# Patient Record
Sex: MALE | Race: WHITE | Employment: OTHER | URBAN - METROPOLITAN AREA
[De-identification: names, ages, dates, MRNs, and addresses within clinical notes are randomized per-mention and may not be internally consistent; named-entity substitution may affect disease eponyms.]

---

## 2017-01-23 ENCOUNTER — APPOINTMENT (OUTPATIENT)
Dept: MRI IMAGING | Age: 82
End: 2017-01-23
Attending: INTERNAL MEDICINE
Payer: MEDICARE

## 2017-01-23 ENCOUNTER — APPOINTMENT (OUTPATIENT)
Dept: CT IMAGING | Age: 82
End: 2017-01-23
Attending: EMERGENCY MEDICINE
Payer: MEDICARE

## 2017-01-23 ENCOUNTER — APPOINTMENT (OUTPATIENT)
Dept: ULTRASOUND IMAGING | Age: 82
End: 2017-01-23
Attending: INTERNAL MEDICINE
Payer: MEDICARE

## 2017-01-23 ENCOUNTER — HOSPITAL ENCOUNTER (OUTPATIENT)
Age: 82
Setting detail: OBSERVATION
Discharge: HOME HEALTH | End: 2017-01-26
Attending: EMERGENCY MEDICINE | Admitting: INTERNAL MEDICINE
Payer: MEDICARE

## 2017-01-23 ENCOUNTER — APPOINTMENT (OUTPATIENT)
Dept: GENERAL RADIOLOGY | Age: 82
End: 2017-01-23
Attending: EMERGENCY MEDICINE
Payer: MEDICARE

## 2017-01-23 DIAGNOSIS — G45.8 OTHER SPECIFIED TRANSIENT CEREBRAL ISCHEMIAS: Primary | ICD-10-CM

## 2017-01-23 DIAGNOSIS — M62.82 NON-TRAUMATIC RHABDOMYOLYSIS: ICD-10-CM

## 2017-01-23 PROBLEM — G45.9 TIA (TRANSIENT ISCHEMIC ATTACK): Status: ACTIVE | Noted: 2017-01-23

## 2017-01-23 LAB
ALBUMIN SERPL BCP-MCNC: 3.3 G/DL (ref 3.2–4.6)
ALBUMIN/GLOB SERPL: 0.9 {RATIO} (ref 1.2–3.5)
ALP SERPL-CCNC: 47 U/L (ref 50–136)
ALT SERPL-CCNC: 14 U/L (ref 12–65)
ANION GAP BLD CALC-SCNC: 8 MMOL/L (ref 7–16)
APPEARANCE UR: CLEAR
AST SERPL W P-5'-P-CCNC: 44 U/L (ref 15–37)
BACTERIA URNS QL MICRO: 0 /HPF
BASOPHILS # BLD AUTO: 0 K/UL (ref 0–0.2)
BASOPHILS # BLD: 0 % (ref 0–2)
BILIRUB SERPL-MCNC: 0.5 MG/DL (ref 0.2–1.1)
BILIRUB UR QL: NEGATIVE
BUN SERPL-MCNC: 32 MG/DL (ref 8–23)
CALCIUM SERPL-MCNC: 8.9 MG/DL (ref 8.3–10.4)
CASTS URNS QL MICRO: ABNORMAL /LPF
CHLORIDE SERPL-SCNC: 107 MMOL/L (ref 98–107)
CK SERPL-CCNC: 1253 U/L (ref 21–215)
CO2 SERPL-SCNC: 26 MMOL/L (ref 21–32)
COLOR UR: YELLOW
CREAT SERPL-MCNC: 2.75 MG/DL (ref 0.8–1.5)
DIFFERENTIAL METHOD BLD: ABNORMAL
EOSINOPHIL # BLD: 0.1 K/UL (ref 0–0.8)
EOSINOPHIL NFR BLD: 1 % (ref 0.5–7.8)
EPI CELLS #/AREA URNS HPF: ABNORMAL /HPF
ERYTHROCYTE [DISTWIDTH] IN BLOOD BY AUTOMATED COUNT: 14.6 % (ref 11.9–14.6)
EST. AVERAGE GLUCOSE BLD GHB EST-MCNC: 160 MG/DL
GLOBULIN SER CALC-MCNC: 3.8 G/DL (ref 2.3–3.5)
GLUCOSE BLD STRIP.AUTO-MCNC: 177 MG/DL (ref 65–100)
GLUCOSE SERPL-MCNC: 175 MG/DL (ref 65–100)
GLUCOSE UR STRIP.AUTO-MCNC: 100 MG/DL
HBA1C MFR BLD: 7.2 % (ref 4.8–6)
HCT VFR BLD AUTO: 36 % (ref 41.1–50.3)
HGB BLD-MCNC: 11.5 G/DL (ref 13.6–17.2)
HGB UR QL STRIP: ABNORMAL
IMM GRANULOCYTES # BLD: 0 K/UL (ref 0–0.5)
IMM GRANULOCYTES NFR BLD AUTO: 0.4 % (ref 0–5)
KETONES UR QL STRIP.AUTO: NEGATIVE MG/DL
LACTATE BLD-SCNC: 2 MMOL/L (ref 0.5–1.9)
LEUKOCYTE ESTERASE UR QL STRIP.AUTO: NEGATIVE
LYMPHOCYTES # BLD AUTO: 15 % (ref 13–44)
LYMPHOCYTES # BLD: 1.2 K/UL (ref 0.5–4.6)
MCH RBC QN AUTO: 29.9 PG (ref 26.1–32.9)
MCHC RBC AUTO-ENTMCNC: 31.9 G/DL (ref 31.4–35)
MCV RBC AUTO: 93.8 FL (ref 79.6–97.8)
MONOCYTES # BLD: 0.6 K/UL (ref 0.1–1.3)
MONOCYTES NFR BLD AUTO: 7 % (ref 4–12)
NEUTS SEG # BLD: 6 K/UL (ref 1.7–8.2)
NEUTS SEG NFR BLD AUTO: 77 % (ref 43–78)
NITRITE UR QL STRIP.AUTO: NEGATIVE
PH UR STRIP: 5 [PH] (ref 5–9)
PLATELET # BLD AUTO: 323 K/UL (ref 150–450)
PMV BLD AUTO: 10.8 FL (ref 10.8–14.1)
POTASSIUM SERPL-SCNC: 4.4 MMOL/L (ref 3.5–5.1)
PROT SERPL-MCNC: 7.1 G/DL (ref 6.3–8.2)
PROT UR STRIP-MCNC: ABNORMAL MG/DL
RBC # BLD AUTO: 3.84 M/UL (ref 4.23–5.67)
RBC #/AREA URNS HPF: ABNORMAL /HPF
SODIUM SERPL-SCNC: 141 MMOL/L (ref 136–145)
SP GR UR REFRACTOMETRY: 1.02 (ref 1–1.02)
TSH SERPL DL<=0.005 MIU/L-ACNC: 1.33 UIU/ML (ref 0.36–3.74)
UROBILINOGEN UR QL STRIP.AUTO: 0.2 EU/DL (ref 0.2–1)
WBC # BLD AUTO: 7.9 K/UL (ref 4.3–11.1)
WBC URNS QL MICRO: ABNORMAL /HPF

## 2017-01-23 PROCEDURE — 96372 THER/PROPH/DIAG INJ SC/IM: CPT

## 2017-01-23 PROCEDURE — 96360 HYDRATION IV INFUSION INIT: CPT | Performed by: EMERGENCY MEDICINE

## 2017-01-23 PROCEDURE — 71020 XR CHEST PA LAT: CPT

## 2017-01-23 PROCEDURE — 99218 HC RM OBSERVATION: CPT

## 2017-01-23 PROCEDURE — 96361 HYDRATE IV INFUSION ADD-ON: CPT

## 2017-01-23 PROCEDURE — 83605 ASSAY OF LACTIC ACID: CPT

## 2017-01-23 PROCEDURE — 82550 ASSAY OF CK (CPK): CPT | Performed by: EMERGENCY MEDICINE

## 2017-01-23 PROCEDURE — 99285 EMERGENCY DEPT VISIT HI MDM: CPT | Performed by: EMERGENCY MEDICINE

## 2017-01-23 PROCEDURE — 74011250636 HC RX REV CODE- 250/636: Performed by: INTERNAL MEDICINE

## 2017-01-23 PROCEDURE — 84443 ASSAY THYROID STIM HORMONE: CPT | Performed by: INTERNAL MEDICINE

## 2017-01-23 PROCEDURE — 74011250636 HC RX REV CODE- 250/636: Performed by: EMERGENCY MEDICINE

## 2017-01-23 PROCEDURE — 70551 MRI BRAIN STEM W/O DYE: CPT

## 2017-01-23 PROCEDURE — 70450 CT HEAD/BRAIN W/O DYE: CPT

## 2017-01-23 PROCEDURE — G0378 HOSPITAL OBSERVATION PER HR: HCPCS

## 2017-01-23 PROCEDURE — 93880 EXTRACRANIAL BILAT STUDY: CPT

## 2017-01-23 PROCEDURE — 74011000302 HC RX REV CODE- 302: Performed by: INTERNAL MEDICINE

## 2017-01-23 PROCEDURE — 81001 URINALYSIS AUTO W/SCOPE: CPT | Performed by: INTERNAL MEDICINE

## 2017-01-23 PROCEDURE — 85025 COMPLETE CBC W/AUTO DIFF WBC: CPT | Performed by: EMERGENCY MEDICINE

## 2017-01-23 PROCEDURE — 74011636637 HC RX REV CODE- 636/637: Performed by: INTERNAL MEDICINE

## 2017-01-23 PROCEDURE — 83036 HEMOGLOBIN GLYCOSYLATED A1C: CPT | Performed by: INTERNAL MEDICINE

## 2017-01-23 PROCEDURE — 82962 GLUCOSE BLOOD TEST: CPT

## 2017-01-23 PROCEDURE — A9270 NON-COVERED ITEM OR SERVICE: HCPCS | Performed by: INTERNAL MEDICINE

## 2017-01-23 PROCEDURE — 96361 HYDRATE IV INFUSION ADD-ON: CPT | Performed by: EMERGENCY MEDICINE

## 2017-01-23 PROCEDURE — 86580 TB INTRADERMAL TEST: CPT | Performed by: INTERNAL MEDICINE

## 2017-01-23 PROCEDURE — 80053 COMPREHEN METABOLIC PANEL: CPT | Performed by: EMERGENCY MEDICINE

## 2017-01-23 PROCEDURE — 74011250637 HC RX REV CODE- 250/637: Performed by: INTERNAL MEDICINE

## 2017-01-23 RX ORDER — DOCUSATE SODIUM 100 MG/1
100 CAPSULE, LIQUID FILLED ORAL 2 TIMES DAILY
Status: DISCONTINUED | OUTPATIENT
Start: 2017-01-23 | End: 2017-01-26 | Stop reason: HOSPADM

## 2017-01-23 RX ORDER — SODIUM CHLORIDE 9 MG/ML
500 INJECTION, SOLUTION INTRAVENOUS ONCE
Status: COMPLETED | OUTPATIENT
Start: 2017-01-23 | End: 2017-01-23

## 2017-01-23 RX ORDER — GUAIFENESIN 100 MG/5ML
81 LIQUID (ML) ORAL DAILY
Status: DISCONTINUED | OUTPATIENT
Start: 2017-01-24 | End: 2017-01-26 | Stop reason: HOSPADM

## 2017-01-23 RX ORDER — TRAZODONE HYDROCHLORIDE 50 MG/1
100 TABLET ORAL
Status: DISCONTINUED | OUTPATIENT
Start: 2017-01-23 | End: 2017-01-26 | Stop reason: HOSPADM

## 2017-01-23 RX ORDER — SODIUM CHLORIDE 0.9 % (FLUSH) 0.9 %
5-10 SYRINGE (ML) INJECTION EVERY 8 HOURS
Status: DISCONTINUED | OUTPATIENT
Start: 2017-01-23 | End: 2017-01-26 | Stop reason: HOSPADM

## 2017-01-23 RX ORDER — GABAPENTIN 300 MG/1
300 CAPSULE ORAL DAILY
Status: DISCONTINUED | OUTPATIENT
Start: 2017-01-24 | End: 2017-01-26 | Stop reason: HOSPADM

## 2017-01-23 RX ORDER — MIDODRINE HYDROCHLORIDE 5 MG/1
5 TABLET ORAL
Status: DISCONTINUED | OUTPATIENT
Start: 2017-01-24 | End: 2017-01-26 | Stop reason: HOSPADM

## 2017-01-23 RX ORDER — FINASTERIDE 5 MG/1
5 TABLET, FILM COATED ORAL DAILY
Status: DISCONTINUED | OUTPATIENT
Start: 2017-01-24 | End: 2017-01-26 | Stop reason: HOSPADM

## 2017-01-23 RX ORDER — CIPROFLOXACIN 500 MG/1
500 TABLET ORAL EVERY 24 HOURS
Status: DISCONTINUED | OUTPATIENT
Start: 2017-01-23 | End: 2017-01-26 | Stop reason: HOSPADM

## 2017-01-23 RX ORDER — INSULIN LISPRO 100 [IU]/ML
INJECTION, SOLUTION INTRAVENOUS; SUBCUTANEOUS
Status: DISCONTINUED | OUTPATIENT
Start: 2017-01-23 | End: 2017-01-26 | Stop reason: HOSPADM

## 2017-01-23 RX ORDER — SOLIFENACIN SUCCINATE 10 MG/1
10 TABLET, FILM COATED ORAL DAILY
Status: DISCONTINUED | OUTPATIENT
Start: 2017-01-24 | End: 2017-01-26 | Stop reason: HOSPADM

## 2017-01-23 RX ORDER — SODIUM CHLORIDE 0.9 % (FLUSH) 0.9 %
5-10 SYRINGE (ML) INJECTION AS NEEDED
Status: DISCONTINUED | OUTPATIENT
Start: 2017-01-23 | End: 2017-01-26 | Stop reason: HOSPADM

## 2017-01-23 RX ORDER — INSULIN GLARGINE 100 [IU]/ML
10 INJECTION, SOLUTION SUBCUTANEOUS
Status: DISCONTINUED | OUTPATIENT
Start: 2017-01-23 | End: 2017-01-26 | Stop reason: HOSPADM

## 2017-01-23 RX ORDER — HEPARIN SODIUM 5000 [USP'U]/ML
5000 INJECTION, SOLUTION INTRAVENOUS; SUBCUTANEOUS EVERY 8 HOURS
Status: DISCONTINUED | OUTPATIENT
Start: 2017-01-23 | End: 2017-01-26 | Stop reason: HOSPADM

## 2017-01-23 RX ORDER — HYDROCODONE BITARTRATE AND ACETAMINOPHEN 5; 325 MG/1; MG/1
1 TABLET ORAL
Status: DISCONTINUED | OUTPATIENT
Start: 2017-01-23 | End: 2017-01-26 | Stop reason: HOSPADM

## 2017-01-23 RX ORDER — ACETAMINOPHEN 325 MG/1
650 TABLET ORAL
Status: DISCONTINUED | OUTPATIENT
Start: 2017-01-23 | End: 2017-01-26 | Stop reason: HOSPADM

## 2017-01-23 RX ORDER — TAMSULOSIN HYDROCHLORIDE 0.4 MG/1
0.4 CAPSULE ORAL DAILY
Status: DISCONTINUED | OUTPATIENT
Start: 2017-01-24 | End: 2017-01-26 | Stop reason: HOSPADM

## 2017-01-23 RX ORDER — ESCITALOPRAM OXALATE 10 MG/1
20 TABLET ORAL DAILY
Status: DISCONTINUED | OUTPATIENT
Start: 2017-01-24 | End: 2017-01-26 | Stop reason: HOSPADM

## 2017-01-23 RX ORDER — CIPROFLOXACIN 500 MG/1
500 TABLET ORAL EVERY 12 HOURS
Status: DISCONTINUED | OUTPATIENT
Start: 2017-01-23 | End: 2017-01-23 | Stop reason: DRUGHIGH

## 2017-01-23 RX ORDER — ZOLPIDEM TARTRATE 5 MG/1
10 TABLET ORAL
Status: DISCONTINUED | OUTPATIENT
Start: 2017-01-23 | End: 2017-01-26 | Stop reason: HOSPADM

## 2017-01-23 RX ORDER — LANOLIN ALCOHOL/MO/W.PET/CERES
1 CREAM (GRAM) TOPICAL
Status: DISCONTINUED | OUTPATIENT
Start: 2017-01-24 | End: 2017-01-26 | Stop reason: HOSPADM

## 2017-01-23 RX ORDER — BISACODYL 5 MG
5 TABLET, DELAYED RELEASE (ENTERIC COATED) ORAL DAILY PRN
Status: DISCONTINUED | OUTPATIENT
Start: 2017-01-23 | End: 2017-01-26 | Stop reason: HOSPADM

## 2017-01-23 RX ORDER — SODIUM CHLORIDE 9 MG/ML
1000 INJECTION, SOLUTION INTRAVENOUS ONCE
Status: COMPLETED | OUTPATIENT
Start: 2017-01-23 | End: 2017-01-23

## 2017-01-23 RX ORDER — LANOLIN ALCOHOL/MO/W.PET/CERES
1000 CREAM (GRAM) TOPICAL DAILY
Status: DISCONTINUED | OUTPATIENT
Start: 2017-01-24 | End: 2017-01-26 | Stop reason: HOSPADM

## 2017-01-23 RX ORDER — SODIUM CHLORIDE 9 MG/ML
100 INJECTION, SOLUTION INTRAVENOUS CONTINUOUS
Status: DISCONTINUED | OUTPATIENT
Start: 2017-01-23 | End: 2017-01-26 | Stop reason: HOSPADM

## 2017-01-23 RX ORDER — TRAMADOL HYDROCHLORIDE 50 MG/1
50 TABLET ORAL
Status: DISCONTINUED | OUTPATIENT
Start: 2017-01-23 | End: 2017-01-26 | Stop reason: HOSPADM

## 2017-01-23 RX ADMIN — INSULIN LISPRO 2 UNITS: 100 INJECTION, SOLUTION INTRAVENOUS; SUBCUTANEOUS at 23:13

## 2017-01-23 RX ADMIN — Medication 5 ML: at 23:15

## 2017-01-23 RX ADMIN — Medication 5 ML: at 23:14

## 2017-01-23 RX ADMIN — SODIUM CHLORIDE 500 ML: 900 INJECTION, SOLUTION INTRAVENOUS at 14:50

## 2017-01-23 RX ADMIN — HEPARIN SODIUM 5000 UNITS: 5000 INJECTION, SOLUTION INTRAVENOUS; SUBCUTANEOUS at 23:10

## 2017-01-23 RX ADMIN — SODIUM CHLORIDE 500 ML: 900 INJECTION, SOLUTION INTRAVENOUS at 13:41

## 2017-01-23 RX ADMIN — CIPROFLOXACIN HYDROCHLORIDE 500 MG: 500 TABLET, FILM COATED ORAL at 23:14

## 2017-01-23 RX ADMIN — HYDROCODONE BITARTRATE AND ACETAMINOPHEN 1 TABLET: 5; 325 TABLET ORAL at 23:14

## 2017-01-23 RX ADMIN — TUBERCULIN PURIFIED PROTEIN DERIVATIVE 5 UNITS: 5 INJECTION INTRADERMAL at 23:20

## 2017-01-23 RX ADMIN — SODIUM CHLORIDE 1000 ML: 900 INJECTION, SOLUTION INTRAVENOUS at 16:55

## 2017-01-23 RX ADMIN — TRAZODONE HYDROCHLORIDE 100 MG: 50 TABLET ORAL at 23:15

## 2017-01-23 RX ADMIN — DOCUSATE SODIUM 100 MG: 100 CAPSULE, LIQUID FILLED ORAL at 23:14

## 2017-01-23 RX ADMIN — SODIUM CHLORIDE 100 ML/HR: 900 INJECTION, SOLUTION INTRAVENOUS at 23:11

## 2017-01-23 RX ADMIN — INSULIN GLARGINE 10 UNITS: 100 INJECTION, SOLUTION SUBCUTANEOUS at 23:12

## 2017-01-23 NOTE — ED NOTES
TRANSFER - OUT REPORT:    Verbal report given to [facility] RN(name) on [employer]  being transferred to 8th(unit) for routine progression of care       Report consisted of patients Situation, Background, Assessment and   Recommendations(SBAR). Information from the following report(s) SBAR and ED Summary was reviewed with the receiving nurse. Lines:   Peripheral IV 05/30/16 Left Antecubital (Active)        Opportunity for questions and clarification was provided.       Patient transported with:   [employer]

## 2017-01-23 NOTE — IP AVS SNAPSHOT
Current Discharge Medication List  
  
Take these medications at their scheduled times Dose & Instructions Dispensing Information Comments Morning Noon Evening Bedtime  
 aspirin delayed-release 81 mg tablet Commonly known as:  ECOTRIN LOW STRENGTH Your next dose is: Today, Tomorrow Other:  ____________ Dose:  81 mg Take 1 Tab by mouth daily. Quantity:  30 Tab Refills:  0  
     
   
   
   
  
 atorvastatin 20 mg tablet Commonly known as:  LIPITOR Your next dose is: Today, Tomorrow Other:  ____________ Dose:  20 mg Take 1 Tab by mouth daily. Quantity:  90 Tab Refills:  1  
     
   
   
   
  
 bethanechol 25 mg tablet Commonly known as:  URECHOLINE Your next dose is: Today, Tomorrow Other:  ____________ Dose:  25 mg Take 1 Tab by mouth Before breakfast, lunch, and dinner. Quantity:  270 Tab Refills:  1  
     
   
   
   
  
 cyanocobalamin 1,000 mcg tablet Your next dose is: Today, Tomorrow Other:  ____________ Dose:  1000 mcg Take 1,000 mcg by mouth daily. Refills:  0  
     
   
   
   
  
 docusate sodium 100 mg capsule Commonly known as:  Melvenia Clipper Your next dose is: Today, Tomorrow Other:  ____________ Dose:  100 mg Take 100 mg by mouth two (2) times a day. Refills:  0  
     
   
   
   
  
 escitalopram oxalate 20 mg tablet Commonly known as:  Solvang Denier Your next dose is: Today, Tomorrow Other:  ____________ Dose:  20 mg Take 1 Tab by mouth daily. Quantity:  90 Tab Refills:  1  
     
   
   
   
  
 fenofibrate nanocrystallized 145 mg tablet Commonly known as:  [employer] Your next dose is: Today, Tomorrow Other:  ____________ Dose:  145 mg Take 1 Tab by mouth daily. Quantity:  90 Tab Refills:  1  
     
   
   
   
  
 ferrous sulfate 325 mg (65 mg iron) tablet Your next dose is: Today, Tomorrow Other:  ____________ Take  by mouth Daily (before breakfast). Refills:  0  
     
   
   
   
  
 finasteride 5 mg tablet Commonly known as:  PROSCAR Your next dose is: Today, Tomorrow Other:  ____________ Dose:  5 mg Take 1 Tab by mouth daily. Quantity:  90 Tab Refills:  1  
     
   
   
   
  
 gabapentin 300 mg capsule Commonly known as:  NEURONTIN Your next dose is: Today, Tomorrow Other:  ____________ Dose:  300 mg Take 1 Cap by mouth daily. Quantity:  90 Cap Refills:  1  
     
   
   
   
  
 insulin glargine 100 unit/mL (3 mL) pen Commonly known as:  LANTUS SOLOSTAR Your next dose is: Today, Tomorrow Other:  ____________ Dose:  10 Units 10 Units by SubCUTAneous route daily. Quantity:  1 Each Refills:  5  
     
   
   
   
  
 midodrine 5 mg tablet Commonly known as:  Ashley Brood Your next dose is: Today, Tomorrow Other:  ____________ Dose:  1 Tab Take 1 Tab by mouth three (3) times daily. Refills:  0  
     
   
   
   
  
 pen needle, diabetic 32 gauge x 1/6\" Ndle Commonly known as:  Justyna Sheikh Your next dose is: Today, Tomorrow Other:  ____________ Dose:  1 Each  
1 Each by Does Not Apply route daily. Use daily with victoza Quantity:  100 Pen Needle Refills:  1  
     
   
   
   
  
 tamsulosin 0.4 mg capsule Commonly known as:  FLOMAX Your next dose is: Today, Tomorrow Other:  ____________ Dose:  0.4 mg Take 1 Cap by mouth daily. Quantity:  90 Cap Refills:  1  
     
   
   
   
  
 tolterodine ER 4 mg ER capsule Commonly known as:  Hannah Leblanc Your next dose is: Today, Tomorrow Other:  ____________ Dose:  4 mg Take 1 Cap by mouth daily. Quantity:  90 Cap Refills:  1  
     
   
   
   
  
 traZODone 100 mg tablet Commonly known as:  [place] Mendez Your next dose is: Today, Tomorrow Other:  ____________ Dose:  100 mg Take 1 Tab by mouth nightly. Quantity:  90 Tab Refills:  1 Take these medications as needed Dose & Instructions Dispensing Information Comments Morning Noon Evening Bedtime HYDROcodone-acetaminophen 5-325 mg per tablet Commonly known as:  Chris Pride Your next dose is: Today, Tomorrow Other:  ____________ Dose:  1 Tab Take 1 Tab by mouth every eight (8) hours as needed. Max Daily Amount: 3 Tabs. Quantity:  20 Tab Refills:  0  
     
   
   
   
  
 traMADol 50 mg tablet Commonly known as:  ULTRAM  
   
Your next dose is: Today, Tomorrow Other:  ____________ Dose:  50 mg Take 50 mg by mouth every six (6) hours as needed for Pain. Refills:  0  
     
   
   
   
  
 zolpidem CR 12.5 mg tablet Commonly known as:  AMBIEN CR Your next dose is: Today, Tomorrow Other:  ____________ Dose:  12.5 mg Take 1 Tab by mouth nightly as needed for Sleep. Max Daily Amount: 12.5 mg.  
 Quantity:  30 Tab Refills:  3 Take these medications as directed Dose & Instructions Dispensing Information Comments Morning Noon Evening Bedtime Suri Chery [id number] mg-mg-million Tab Your next dose is: Today, Tomorrow Other:  ____________ Take  by mouth. Refills:  0 Vit A,C,E-Zinc-Copper Cap capsule Commonly known as:  PRESERVISION Your next dose is: Today, Tomorrow Other:  ____________ Dose:  1 Cap Take 1 Cap by mouth. Refills:  0 Where to Get Your Medications Information about where to get these medications is not yet available ! Ask your nurse or doctor about these medications  
  aspirin delayed-release 81 mg tablet

## 2017-01-23 NOTE — ED PROVIDER NOTES
HPI     80-year-old male presenting to the emergency department by EMS today for a fall. He is accompanied by a caregiver who provides the majority of the history. Mohini Hassan lives alone and last night was trying to get out of bed when he fell hitting his head. This was at approximately 10 PM.  He states he was unable to stand up and to get himself off of the floor until 6:30 in the morning when his grandson came by to check on him. He attempted 6 times to get up off of the floor falling back each time. The caregiver states that when she came in to the house and found him today he had significant slurred speech and was extremely weak. He was so weak he was unable to help participate rolling over in bed. She notes that this is a significant deviation from his baseline considering that last night she was able to take him out to dinner, and he is able to ambulate with his walker without any problems. He normally lives in a mother-in-law apartment completely independently. His slurred speech has since resolved. And his generalized weakness appears to be resolving as well, per the caregiver. They also note that he is recently being treated for a urinary tract infection and is currently taking antibiotics. He has not had any fevers nausea vomiting diarrhea. He denies any chest pain or shortness of breath. He has no complaints at this time.     Past Medical History:   Diagnosis Date    Arthritis     Bilateral impacted cerumen 8/4/2016    Chronic kidney disease     DDD (degenerative disc disease), cervical     Diabetes ([facility] [address]     Hearing abnormally acute 10/26/2015    Hearing deficit     History of cataract surgery 10/26/2015    Hypercholesterolemia     Kidney disease     Macular degeneration     Sleep disorder     SNHL (sensorineural hearing loss) 8/4/2016    Vision loss 8/4/2016       Past Surgical History:   Procedure Laterality Date    Hx cataract removal      Hx prostatectomy  Hx urological       hernia repair on left inguinal    Hx tonsillectomy       tonsilectomy and adnoidectomy    Hx other surgical       right calf mass removal    Hx colonoscopy  2015    Hx knee replacement Left 2016    Hx shoulder replacement           Family History:   Problem Relation Age of Onset    Cancer Mother      lung    Cancer Father      stomach    Diabetes Father        Social History     Social History    Marital status: UNKNOWN     Spouse name: N/A    Number of children: N/A    Years of education: N/A     Occupational History    Not on file. Social History Main Topics    Smoking status: Former Smoker     Packs/day: 1.00     Years: 13.00     Types: Cigarettes     Quit date: 1/1/1961    Smokeless tobacco: Never Used    Alcohol use No      Comment: occ    Drug use: No    Sexual activity: Not Currently     Other Topics Concern    Not on file     Social History Narrative         ALLERGIES: Review of patient's allergies indicates no known allergies. Review of Systems   Constitutional: Negative for fatigue and fever. HENT: Negative. Eyes: Negative. Respiratory: Negative for cough, chest tightness, shortness of breath and wheezing. Cardiovascular: Negative for chest pain. Gastrointestinal: Negative for abdominal distention, abdominal pain, diarrhea, nausea and vomiting. Genitourinary: Negative for dysuria, flank pain, frequency, genital sores and urgency. Musculoskeletal: Negative. Skin: Negative for rash. Neurological: Positive for speech difficulty and weakness. Negative for dizziness, syncope and headaches. All other systems reviewed and are negative. Vitals:    01/23/17 1244   BP: 119/76   Pulse: 98   Resp: 18   Temp: 97.7 °F (36.5 °C)   SpO2: 92%   Weight: 95.3 kg (210 lb)   Height: 5' 5\" (1.651 m)            Physical Exam   Constitutional: He is oriented to person, place, and time. He appears well-developed and well-nourished. No distress.    HENT: Head: Normocephalic and atraumatic. Eyes: EOM are normal. Pupils are equal, round, and reactive to light. Neck: Normal range of motion. Neck supple. Cardiovascular: Normal rate, regular rhythm and normal heart sounds. Exam reveals no gallop and no friction rub. No murmur heard. Pulmonary/Chest: Effort normal and breath sounds normal. No stridor. No respiratory distress. He has no wheezes. Abdominal: Soft. Bowel sounds are normal. He exhibits no distension and no mass. There is no tenderness. There is no rebound and no guarding. Musculoskeletal: Normal range of motion. He exhibits edema (1+ bilat LE). He exhibits no tenderness or deformity. Neurological: He is alert and oriented to person, place, and time. No cranial nerve deficit. Coordination normal.   Cranial nerves II through XII intact, sensation intact in all distributions, finger-nose-finger intact on the left, and in point dysmetria present with finger-nose-finger on the right. Skin: Skin is warm. No rash noted. He is not diaphoretic. No erythema. Psychiatric: He has a normal mood and affect. His behavior is normal. Thought content normal.   Vitals reviewed. MDM  Number of Diagnoses or Management Options  Non-traumatic rhabdomyolysis:   Other specified transient cerebral ischemias:   Diagnosis management comments: Differential diagnosis: Intracranial injury, acute CVA, rhabdomyolysis, sepsis    61-year-old male presenting with strokelike symptoms that have since improved significantly, most consistent with TIA. However he does still have some endpoint dysmetria on the right upper extremity. He'll be admitted to the hospital for further evaluation of TIA. Additionally, he is noted to have a slight bump in his CK of 1200 likely due to having been down for 10 hours. In given fluids. This also likely explains his very slightly elevated lactate.   I discussed the case with the hospitalist who've agreed to admit for further treatment and care.     ED Course       Procedures

## 2017-01-23 NOTE — IP AVS SNAPSHOT
Summary of Care Report The Summary of Care report has been created to help improve care coordination. Users with access to [employer] or [address] (Web-based application) may access additional patient information including the Discharge Summary. If you are not currently a [address] user and need more information, please call the number listed below in the [place] [address] section and ask to be connected with Medical Records. Facility Information Name Address Phone [address] [address] [address] [phone] Patient Information Patient Name Sex  Anh Abraham ([id number]) Male 3/3/1932 Discharge Information Admitting Provider Service Area Unit Jen Wong, DO / [address]  8 Med Surg / [phone] Discharge Provider Discharge Date/Time Discharge Disposition Destination (none) 2017 (Pending) [facility] (none) Patient Language Language ENGLISH [13] Problem List as of 2017  Date Reviewed: 2017 Codes Priority Class Noted - Resolved Chronic back pain (Chronic) ICD-10-CM: M54.9, G89.29 ICD-9-CM: 724.5, 338.29   10/26/2015 - Present Controlled type 2 diabetes mellitus with stage 4 chronic kidney disease, with long-term current use of insulin (HCC) (Chronic) ICD-10-CM: E11.22, N18.4, Z79.4 ICD-9-CM: 250.40, 585.4, V58.67   6/3/2016 - Present Macular degeneration (Chronic) ICD-10-CM: H35.30 ICD-9-CM: 362.50   10/26/2015 - Present Osteoarthritis of both knees (Chronic) ICD-10-CM: M17.0 ICD-9-CM: 715.96   10/27/2015 - Present BPH (benign prostatic hyperplasia) (Chronic) ICD-10-CM: N40.0 ICD-9-CM: 600.00   10/27/2015 - Present Anemia ICD-10-CM: D64.9 ICD-9-CM: 285.9   10/27/2015 - Present Sleep disorder ICD-10-CM: G47.9 ICD-9-CM: 780.50   3/3/2016 - Present Sepsis ([facility] ICD-10-CM: A41.9 ICD-9-CM: 038.9, 995.91   5/30/2016 - Present UTI (urinary tract infection) ICD-10-CM: N39.0 ICD-9-CM: 599.0   5/30/2016 - Present RESOLVED: Tachycardia ICD-10-CM: R00.0 ICD-9-CM: 785.0   5/30/2016 - 6/1/2016 CKD (chronic kidney disease) (Chronic) ICD-10-CM: N18.9 ICD-9-CM: 585.9   5/30/2016 - Present Urinary retention (Chronic) ICD-10-CM: R33.9 ICD-9-CM: 788.20   5/30/2016 - Present RESOLVED: Septic shock (HCC) ICD-10-CM: A41.9, R65.21 ICD-9-CM: 038.9, 785.52, 995.92   5/31/2016 - 6/2/2016 RESOLVED: Bacteremia ICD-10-CM: R78.81 ICD-9-CM: 790.7   6/1/2016 - 9/13/2016 Acute respiratory failure with hypoxemia [facility]) ICD-10-CM: J96.01 
ICD-9-CM: 518.81   6/1/2016 - Present Aspiration pneumonia ([facility] ICD-10-CM: J69.0 ICD-9-CM: 507.0   6/1/2016 - Present Acute on chronic renal failure (HCC) ICD-10-CM: N17.9, N18.9 ICD-9-CM: 584.9, 585.9   6/3/2016 - Present Arthritis ICD-10-CM: M19.90 ICD-9-CM: 716.90   8/4/2016 - Present Vision loss ICD-10-CM: H54.7 ICD-9-CM: 369.9   8/4/2016 - Present Bilateral impacted cerumen ICD-10-CM: I78.05 
ICD-9-CM: 380.4   8/4/2016 - Present SNHL (sensorineural hearing loss) ICD-10-CM: H90.5 ICD-9-CM: 389.10   8/4/2016 - Present Screening for alcoholism ICD-10-CM: Z13.89 ICD-9-CM: V79.1   9/9/2016 - Present Routine general medical examination at a health care facility ICD-10-CM: Z00.00 ICD-9-CM: V70.0   9/9/2016 - Present Screening for depression ICD-10-CM: Z13.89 ICD-9-CM: V79.0   9/9/2016 - Present Mixed hyperlipidemia ICD-10-CM: E78.2 ICD-9-CM: 272.2   9/9/2016 - Present Primary insomnia ICD-10-CM: F51.01 
ICD-9-CM: 307.42   9/9/2016 - Present Encounter for long-term (current) use of insulin ([address] ICD-10-CM: Z79.4 ICD-9-CM: V58.67   9/9/2016 - Present Diabetes ([address] ICD-10-CM: E11.9 ICD-9-CM: 250.00   Unknown - Present Rhabdomyolysis ICD-10-CM: G14.78 ICD-9-CM: 728.88   1/23/2017 - Present  
 TIA (transient ischemic attack) ICD-10-CM: G45.9 ICD-9-CM: 435.9   1/23/2017 - Present You are allergic to the following No active allergies Current Discharge Medication List  
  
START taking these medications Dose & Instructions Dispensing Information Comments  
 aspirin delayed-release 81 mg tablet Commonly known as:  ECOTRIN LOW STRENGTH Dose:  81 mg Take 1 Tab by mouth daily. Quantity:  30 Tab Refills:  0 CONTINUE these medications which have NOT CHANGED Dose & Instructions Dispensing Information Comments  
 atorvastatin 20 mg tablet Commonly known as:  LIPITOR Dose:  20 mg Take 1 Tab by mouth daily. Quantity:  90 Tab Refills:  1  Isma 921-21-02 mg-mg-million Tab Take  by mouth. Refills:  0  
   
 bethanechol 25 mg tablet Commonly known as:  URECHOLINE Dose:  25 mg Take 1 Tab by mouth Before breakfast, lunch, and dinner. Quantity:  270 Tab Refills:  1  
   
 cyanocobalamin 1,000 mcg tablet Dose:  1000 mcg Take 1,000 mcg by mouth daily. Refills:  0  
   
 docusate sodium 100 mg capsule Commonly known as:  Annice Jolie Dose:  100 mg Take 100 mg by mouth two (2) times a day. Refills:  0  
   
 escitalopram oxalate 20 mg tablet Commonly known as:  Arty Hardy Dose:  20 mg Take 1 Tab by mouth daily. Quantity:  90 Tab Refills:  1  
   
 fenofibrate nanocrystallized 145 mg tablet Commonly known as:  [employer] Dose:  145 mg Take 1 Tab by mouth daily. Quantity:  90 Tab Refills:  1  
   
 ferrous sulfate 325 mg (65 mg iron) tablet Take  by mouth Daily (before breakfast). Refills:  0  
   
 finasteride 5 mg tablet Commonly known as:  PROSCAR Dose:  5 mg Take 1 Tab by mouth daily. Quantity:  90 Tab Refills:  1  
   
 gabapentin 300 mg capsule Commonly known as:  NEURONTIN  Dose:  300 mg  
 Take 1 Cap by mouth daily. Quantity:  90 Cap Refills:  1 HYDROcodone-acetaminophen 5-325 mg per tablet Commonly known as:  Viki Oyster Dose:  1 Tab Take 1 Tab by mouth every eight (8) hours as needed. Max Daily Amount: 3 Tabs. Quantity:  20 Tab Refills:  0  
   
 insulin glargine 100 unit/mL (3 mL) pen Commonly known as:  LANTUS SOLOSTAR Dose:  10 Units 10 Units by SubCUTAneous route daily. Quantity:  1 Each Refills:  5  
   
 midodrine 5 mg tablet Commonly known as:  Zonia Maxon Dose:  1 Tab Take 1 Tab by mouth three (3) times daily. Refills:  0  
   
 pen needle, diabetic 32 gauge x 1/6\" Ndle Commonly known as:  John Meals Dose:  1 Each  
1 Each by Does Not Apply route daily. Use daily with victoza Quantity:  100 Pen Needle Refills:  1  
   
 tamsulosin 0.4 mg capsule Commonly known as:  FLOMAX Dose:  0.4 mg Take 1 Cap by mouth daily. Quantity:  90 Cap Refills:  1  
   
 tolterodine ER 4 mg ER capsule Commonly known as:  Lethea Raring Dose:  4 mg Take 1 Cap by mouth daily. Quantity:  90 Cap Refills:  1  
   
 traMADol 50 mg tablet Commonly known as:  ULTRAM  
 Dose:  50 mg Take 50 mg by mouth every six (6) hours as needed for Pain. Refills:  0  
   
 traZODone 100 mg tablet Commonly known as:  Hessie Valladares Dose:  100 mg Take 1 Tab by mouth nightly. Quantity:  90 Tab Refills:  1 Vit A,C,E-Zinc-Copper Cap capsule Commonly known as:  PRESERVISION Dose:  1 Cap Take 1 Cap by mouth. Refills:  0  
   
 zolpidem CR 12.5 mg tablet Commonly known as:  AMBIEN CR Dose:  12.5 mg Take 1 Tab by mouth nightly as needed for Sleep. Max Daily Amount: 12.5 mg.  
 Quantity:  30 Tab Refills:  3 STOP taking these medications Comments  
 ciprofloxacin HCl 500 mg tablet Commonly known as:  CIPRO varicella zoster vacine live 19,400 unit/0.65 mL Susr injection Commonly known as:  ZOSTAVAX Current Immunizations Name Date Influenza High Dose Vaccine PF 9/9/2016, 11/17/2015 Pneumococcal Conjugate (PCV-13) 12/22/2016 TB Skin Test (PPD) Intradermal 1/23/2017, 6/1/2016 Follow-up Information Follow up With Details Comments Contact Ale Colbert PA-C   [address] [place] [address] Angelica Blanca 
[phone] Discharge Instructions Rhabdomyolysis: Care Instructions Your Care Instructions When you have rhabdomyolysis (say \"rpc-fib-me-AH-alejandra-suss\"), dying muscle cells cause toxins to build up in the blood. If not treated, it can cause life-threatening damage to the body's organs. It can be caused by many things, such as severe muscle injury, some medicines (like statins), the flu, and certain blood infections. Symptoms may include weak muscles, pain, stiffness, fever, and nausea. Your urine may also be dark. You will get treatment in the hospital. If possible, the doctor will stop the cause of muscle cell death. The doctor will take steps to protect your organs. You may have to stop taking certain medicines if they are the cause of the problem. You will also get treatment to help the kidneys remove the toxins from your blood. This includes plenty of fluids. You may get fluids through a vein (by IV). You may also need dialysis. Follow-up care is a key part of your treatment and safety. Be sure to make and go to all appointments, and call your doctor if you are having problems. It's also a good idea to know your test results and keep a list of the medicines you take. How can you care for yourself at home? · Take pain medicines exactly as directed. ¨ If the doctor gave you a prescription medicine for pain, take it as prescribed. ¨ If you are not taking a prescription pain medicine, ask your doctor if you can take an over-the-counter medicine. · Talk to your doctor about whether you need to stop taking any medicines. Follow your doctor's instructions about stopping medicines. · Drink plenty of fluids, enough so that your urine is light yellow or clear like water. If you have kidney, heart, or liver disease and have to limit fluids, talk with your doctor before you increase the amount of fluids you drink. When should you call for help? Call your doctor now or seek immediate medical care if: 
· You have new or worse muscle pain. · You have less urine than normal or no urine. · You have new swelling in your arms or feet. · You have blood in your urine. Watch closely for changes in your health, and be sure to contact your doctor if you do not get better as expected. Where can you learn more? Go to http://bobby-jessie.info/. Enter F129 in the search box to learn more about \"Rhabdomyolysis: Care Instructions. \" Current as of: November 20, 2015 Content Version: 11.1 © 1405-8777 "[employer]. Care instructions adapted under license by [employer] (which disclaims liability or warranty for this information). If you have questions about a medical condition or this instruction, always ask your healthcare professional. [address] any warranty or liability for your use of this information. Stroke: After Your Visit Your Care Instructions You have had a stroke. Risk factors for stroke include being overweight, smoking, and sedentary lifestyle. This means that the blood flow to a part of your brain was blocked for some time, which damages the nerve cells in that part of the brain. The part of your body controlled by that part of your brain may not function properly now. The brain is an amazing organ that can heal itself to some degree.  The stroke you had damaged part of your brain, but other parts of your brain may take over in some way for the damaged areas. You have already started this process. Going home may be hard for you and your family. The more you can try to do for yourself, the better. Remember to take each day one at a time. Follow-up care is a key part of your treatment and safety. Be sure to make and go to all appointments, and call your doctor if you are having problems. Its also a good idea to know your test results and keep a list of the medicines you take. How can you care for yourself at home? Enter a stroke rehabilitation (rehab) program, if your doctor recommends it. Physical, speech, and occupational therapies can help you manage bathing, dressing, eating, and other basics of daily living. Eat a heart-healthy diet that is low in cholesterol, saturated fat, and salt. Eat lots of fresh fruits and vegetables and foods high in fiber. Increase your activities slowly. Take short rest breaks when you get tired. Gradually increase the amount you walk. Start out by walking a little more than you did the day before. Do not drive until your doctor says it is okay. It is normal to feel sad or depressed after a stroke. If the blues last, talk to your doctor. If you are having problems with urine leakage, go to the bathroom at regular times, including when you first wake up and at bedtime. Also, limit fluids after dinner. If you are constipated, drink plenty of fluids, enough so that your urine is light yellow or clear like water. If you have kidney, heart, or liver disease and have to limit fluids, talk with your doctor before you increase the amount of fluids you drink. Set up a regular time for using the toilet. If you continue to have constipation, your doctor may suggest using a bulking agent, such as Metamucil, or a stool softener, laxative, or enema. Medicines Take your medicines exactly as prescribed.  Call your doctor if you think you are having a problem with your medicine. You may be taking several medicines. ACE (angiotensin-converting enzyme) inhibitors, angiotensin II receptor blockers (ARBs), beta-blockers, diuretics (water pills), and calcium channel blockers control your blood pressure. Statins help lower cholesterol. Your doctor may also prescribe medicines for depression, pain, sleep problems, anxiety, or agitation. If your doctor has given you medicine that prevents blood clots, such as warfarin (Coumadin), aspirin combined with extended-release dipyridamole (Aggrenox), clopidogrel (Plavix), or aspirin to prevent another stroke, you should: 
Tell your dentist, pharmacist, and other health professionals that you take these medicines. Watch for unusual bruising or bleeding, such as blood in your urine, red or black stools, or bleeding from your nose or gums. Get regular blood tests to check your clotting time if you are taking Coumadin. Wear medical alert jewelry that says you take blood thinners. You can buy this at most drugstores. Do not take any over-the-counter medicines or herbal products without talking to your doctor first. 
If you take birth control pills or hormone replacement therapy, talk to your doctor about whether they are right for you. For family members and caregivers Make the home safe. Set up a room so that your loved one does not have to climb stairs. Be sure the bathroom is on the same floor. Move throw rugs and furniture that could cause falls, and make sure that the lighting is good. Put grab bars and seats in tubs and showers. Find out what your loved one can do and what he or she needs help with. Try not to do things for your loved one that your loved one can do on his or her own. Help him or her learn and practice new skills. Visit and talk with your loved one often. Try doing activities together that you both enjoy, such as playing cards or board games.  Keep in touch with your loved one's friends as much as you can, and encourage them to visit. Take care of yourself. Do not try to do everything yourself. Ask other family members to help. Eat well, get enough rest, and take time to do things that you enjoy. Keep up with your own doctor visits, and make sure to take your medicines regularly. Get out of the house as much as you can. Join a local support group. Find out if you qualify for home health care visits to help with rehab or for adult day care. When should you call for help? Call 911 anytime you think you may need emergency care. For example, call if: 
You have signs of another stroke. These may include: 
Sudden numbness, paralysis, or weakness in your face, arm, or leg, especially on only one side of your body. New problems with walking or balance. Sudden vision changes. Drooling or slurred speech. New problems speaking or understanding simple statements, or you feel confused. A sudden, severe headache that is different from past headaches. Call 911 even if these symptoms go away in a few minutes. You cough up blood. You vomit blood or what looks like coffee grounds. You pass maroon or very bloody stools. Call your doctor now or seek immediate medical care if: 
You have new bruises or blood spots under your skin. You have a nosebleed. Your gums bleed when you brush your teeth. You have blood in your urine. Your stools are black and tarlike or have streaks of blood. You have vaginal bleeding when you are not having your period, or heavy period bleeding. You have new symptoms that may be related to your stroke, such as falls or trouble swallowing. Watch closely for changes in your health, and be sure to contact your doctor if you have any problems. Where can you learn more? Go to [employer] Enter C145  in the search box to learn more about \"Stroke: After Your Visit\". © 6989-0489 [employer]. Care instructions adapted under license by [employer] (which disclaims liability or warranty for this information). This care instruction is for use with your licensed healthcare professional. If you have questions about a medical condition or this instruction, always ask your healthcare professional. Kourtney Cardenas any warranty or liability for your use of this information. Chart Review Routing History Recipient Method Report Sent By Lisbet Thomas PA-C Phone: [phone] In Basket Notes Report A Kika Amaral III, MD [[id number]] 12/13/2016  5:02 PM 12/13/2016

## 2017-01-23 NOTE — H&P
HOSPITALIST H&P/CONSULT  NAME:  Roselle Fothergill   Age:  80 y.o.  :   3/3/1932   MRN:   [id number]  PCP: Indira Blevins MD:  Treatment Team: Attending Provider: Mer Guo MD; Primary Nurse: Ruben Dent RN  HPI:   Patient thanh 57A with pmhx of IDDM, CKD, [place], HLP, BPH presents via EMS after falling at home night before. Pt reports that he slipped off edge of bed hitting his head on nightstand and then attempted several times to get up but too weak to get off floor and stayed there until found by grandson this AM. Pt lives in [facility] connected to his daughter's home (who was in [place] this week) and also has caretaker 2-3 days/week who assists in history today. Recently treated for UTI with cipro (on day 7/10). Caretaker reports extremely slurred speech that has resolved during ED eval.   ED w/u - CT head non acute, CPK 1200, Cr 2.7 (baseline 1.7 to 2.2). Hospitalist asked to admit for TIA vs CVA    Complete ROS done and is as stated in HPI or otherwise negative  Past Medical History   Diagnosis Date    Arthritis     Bilateral impacted cerumen 2016    Chronic kidney disease     DDD (degenerative disc disease), cervical     Diabetes ([facility] [address]     Hearing abnormally acute 10/26/2015    Hearing deficit     History of cataract surgery 10/26/2015    Hypercholesterolemia     Kidney disease     Macular degeneration     Sleep disorder     SNHL (sensorineural hearing loss) 2016    Vision loss 2016      Past Surgical History   Procedure Laterality Date    Hx cataract removal      Hx prostatectomy      Hx urological       hernia repair on left inguinal    Hx tonsillectomy       tonsilectomy and adnoidectomy    Hx other surgical       right calf mass removal    Hx colonoscopy      Hx knee replacement Left 2016    Hx shoulder replacement        Prior to Admission Medications   Prescriptions Last Dose Informant Patient Reported? Taking?    Marilou Pallas 439-24-12 mg-mg-million tab   Yes No   Sig: Take  by mouth. HYDROcodone-acetaminophen (NORCO) 5-325 mg per tablet   No No   Sig: Take 1 Tab by mouth every eight (8) hours as needed. Max Daily Amount: 3 Tabs. Vit A,C,E-Zinc-Copper (PRESERVISION) cap capsule   Yes No   Sig: Take 1 Cap by mouth. atorvastatin (LIPITOR) 20 mg tablet   No No   Sig: Take 1 Tab by mouth daily. bethanechol (URECHOLINE) 25 mg tablet   No No   Sig: Take 1 Tab by mouth Before breakfast, lunch, and dinner. ciprofloxacin HCl (CIPRO) 500 mg tablet   No No   Sig: Take 1 Tab by mouth two (2) times a day. cyanocobalamin 1,000 mcg tablet   Yes No   Sig: Take 1,000 mcg by mouth daily. docusate sodium (COLACE) 100 mg capsule   Yes No   Sig: Take 100 mg by mouth two (2) times a day. escitalopram oxalate (LEXAPRO) 20 mg tablet   No No   Sig: Take 1 Tab by mouth daily. fenofibrate nanocrystallized (TRICOR) 145 mg tablet   No No   Sig: Take 1 Tab by mouth daily. ferrous sulfate 325 mg (65 mg iron) tablet   Yes No   Sig: Take  by mouth Daily (before breakfast). finasteride (PROSCAR) 5 mg tablet   No No   Sig: Take 1 Tab by mouth daily. gabapentin (NEURONTIN) 300 mg capsule   No No   Sig: Take 1 Cap by mouth daily. insulin glargine (LANTUS SOLOSTAR) 100 unit/mL (3 mL) pen   No No   Sig: 10 Units by SubCUTAneous route daily. midodrine (PROAMITINE) 5 mg tablet   Yes No   Sig: Take 1 Tab by mouth three (3) times daily. pen needle, diabetic (NOVOFINE PLUS) 32 gauge x \" ndle   No No   Si Each by Does Not Apply route daily. Use daily with victoza   tamsulosin (FLOMAX) 0.4 mg capsule   No No   Sig: Take 1 Cap by mouth daily. tolterodine ER (DETROL LA) 4 mg ER capsule   No No   Sig: Take 1 Cap by mouth daily. traMADol (ULTRAM) 50 mg tablet   Yes No   Sig: Take 50 mg by mouth every six (6) hours as needed for Pain. traZODone (DESYREL) 100 mg tablet   No No   Sig: Take 1 Tab by mouth nightly.    varicella zoster vacine live (ZOSTAVAX) 19,400 unit/0.65 mL susr injection   No No   Si Vial by SubCUTAneous route once for 1 dose. Please fax confirmation of vaccination after administration to [facility] at [phone]   zolpidem CR (AMBIEN CR) 12.5 mg tablet   No No   Sig: Take 1 Tab by mouth nightly as needed for Sleep. Max Daily Amount: 12.5 mg. Facility-Administered Medications: None     No Known Allergies   Social History   Substance Use Topics    Smoking status: Former Smoker     Packs/day: 1.00     Years: 13.00     Types: Cigarettes     Quit date: 1961    Smokeless tobacco: Never Used    Alcohol use No      Comment: occ      Family History   Problem Relation Age of Onset    Cancer Mother      lung    Cancer Father      stomach    Diabetes Father       Objective:     Visit Vitals    /59    Pulse 66    Temp 97.7 °F (36.5 °C)    Resp 18    Ht 5' 5\" (1.651 m)    Wt 95.3 kg (210 lb)    SpO2 99%    BMI 34.95 kg/m2      Temp (24hrs), Av.7 °F (36.5 °C), Min:97.7 °F (36.5 °C), Max:97.7 °F (36.5 °C)    Oxygen Therapy  O2 Sat (%): 99 % (17 1631)  Pulse via Oximetry: 66 beats per minute (17 1631)  O2 Device: Room air (17 1244)  Physical Exam:  General:    Alert, cooperative, no distress, appears stated age. Extremely [place]   Head:   Normocephalic, without obvious abnormality, atraumatic. Nose:  Nares normal. No drainage or sinus tenderness. Lungs:   Clear to auscultation bilaterally. No Wheezing or Rhonchi. No rales. Heart:   Regular rate and rhythm,  no murmur, rub or gallop. Abdomen:   Soft, non-tender. Not distended. Bowel sounds normal.   Extremities: No cyanosis. No edema. No clubbing  Skin:     Texture, turgor normal. No rashes or lesions. Not Jaundiced  Neurologic: Alert and oriented x 3, motor 4/5 LUE, motor 5/5 RUE, 5/5 b/l LE's. Sensation intact/equal ext x 4. CN's intact.  Speech normal. Gait not assessed  Data Review:   Recent Results (from the past 24 hour(s))   CBC WITH AUTOMATED DIFF Collection Time: 01/23/17 12:55 PM   Result Value Ref Range    WBC 7.9 4.3 - 11.1 K/uL    RBC 3.84 (L) 4.23 - 5.67 M/uL    HGB 11.5 (L) 13.6 - 17.2 g/dL    HCT 36.0 (L) 41.1 - 50.3 %    MCV 93.8 79.6 - 97.8 FL    MCH 29.9 26.1 - 32.9 PG    MCHC 31.9 31.4 - 35.0 g/dL    RDW 14.6 11.9 - 14.6 %    PLATELET 304 306 - 793 K/uL    MPV 10.8 10.8 - 14.1 FL    DF AUTOMATED      NEUTROPHILS 77 43 - 78 %    LYMPHOCYTES 15 13 - 44 %    MONOCYTES 7 4.0 - 12.0 %    EOSINOPHILS 1 0.5 - 7.8 %    BASOPHILS 0 0.0 - 2.0 %    IMMATURE GRANULOCYTES 0.4 0.0 - 5.0 %    ABS. NEUTROPHILS 6.0 1.7 - 8.2 K/UL    ABS. LYMPHOCYTES 1.2 0.5 - 4.6 K/UL    ABS. MONOCYTES 0.6 0.1 - 1.3 K/UL    ABS. EOSINOPHILS 0.1 0.0 - 0.8 K/UL    ABS. BASOPHILS 0.0 0.0 - 0.2 K/UL    ABS. IMM. GRANS. 0.0 0.0 - 0.5 K/UL   METABOLIC PANEL, COMPREHENSIVE    Collection Time: 01/23/17 12:55 PM   Result Value Ref Range    Sodium 141 136 - 145 mmol/L    Potassium 4.4 3.5 - 5.1 mmol/L    Chloride 107 98 - 107 mmol/L    CO2 26 21 - 32 mmol/L    Anion gap 8 7 - 16 mmol/L    Glucose 175 (H) 65 - 100 mg/dL    BUN 32 (H) 8 - 23 MG/DL    Creatinine 2.75 (H) 0.8 - 1.5 MG/DL    GFR est AA 28 (L) >60 ml/min/1.73m2    GFR est non-AA 24 (L) >60 ml/min/1.73m2    Calcium 8.9 8.3 - 10.4 MG/DL    Bilirubin, total 0.5 0.2 - 1.1 MG/DL    ALT 14 12 - 65 U/L    AST 44 (H) 15 - 37 U/L    Alk. phosphatase 47 (L) 50 - 136 U/L    Protein, total 7.1 6.3 - 8.2 g/dL    Albumin 3.3 3.2 - 4.6 g/dL    Globulin 3.8 (H) 2.3 - 3.5 g/dL    A-G Ratio 0.9 (L) 1.2 - 3.5     CK    Collection Time: 01/23/17 12:55 PM   Result Value Ref Range    CK 1253 (H) 21 - 215 U/L   POC LACTIC ACID    Collection Time: 01/23/17 12:56 PM   Result Value Ref Range    Lactic Acid (POC) 2.0 (H) 0.5 - 1.9 mmol/L     Imaging /Procedures /Studies   CT head - non acute  CXR - non acute    Assessment and Plan:      Active Hospital Problems    Diagnosis Date Noted    Rhabdomyolysis 01/23/2017    TIA (transient ischemic attack) 01/23/2017    Mixed hyperlipidemia 09/09/2016    Acute on chronic renal failure ([facility] [address] 06/03/2016    Controlled type 2 diabetes mellitus with stage 4 chronic kidney disease, with long-term current use of insulin ([facility] [address] 06/03/2016    UTI (urinary tract infection) 05/30/2016    BPH (benign prostatic hyperplasia) 10/27/2015       A/P  - TIA vs CVA - admit to obs/tele. Ck MRI, Echo, duplex carotid. PT/OT/PPD  - Rhabdo - mild. Start IVF. Repeat ck in AM  - HLP - holding statins in presence of rhabdo. - Acute/Chronic Renal failure - IVF, repeat in AM  - IDDM - resume home management. Start ssi. Ck a1c  - BPH - resume home meds  - recent UTI - continue cipro.  Ask for culture report from PCP    Code Status: Full code but will review when daughter present d/t pt's [facility]    Anticipated discharge: <48hrs    Signed By: Nini Peters DO     January 23, 2017

## 2017-01-23 NOTE — ED TRIAGE NOTES
Pt arrives via [facility] from home c/o fall last night. Family found pt in floor this morning and pt reports that he fell last night. Family member states that his speech was slurred when she found him this morning, but that has resolved. Pt on day 6 of treatment for UTI. Pt unusually weak per family. Pt reports pain to b/l lower ribs after rolling in floor per pt.

## 2017-01-23 NOTE — IP AVS SNAPSHOT
[address] 
[phone] Patient: Ananda Lakhani MRN: [id number] MNE:[birth year] You are allergic to the following No active allergies Immunizations Administered for This Admission Name Date  
 TB Skin Test (PPD) Intradermal 1/23/2017 Recent Documentation Height Weight BMI Smoking Status 1.651 m 93.9 kg 34.45 kg/m2 Former Smoker Unresulted Labs Order Current Status PLEASE READ & DOCUMENT PPD TEST IN 24 HRS Preliminary result PLEASE READ & DOCUMENT PPD TEST IN 48 HRS Preliminary result Emergency Contacts Name Discharge Info Relation Home Work Mobile Juanis Ellington  Daughter [21] [phone] About your hospitalization You were admitted on:  January 23, 2017 You last received care in the:  [facility] 8 MED SURG You were discharged on:  January 26, 2017 Unit phone number:  [phone] Why you were hospitalized Your primary diagnosis was:  Not on File Your diagnoses also included:  Acute On Chronic Renal Failure (Hcc), Bph (Benign Prostatic Hyperplasia), Uti (Urinary Tract Infection), Mixed Hyperlipidemia, Controlled Type 2 Diabetes Mellitus With Stage 4 Chronic Kidney Disease, With Long-Term Current Use Of Insulin (Hcc), Rhabdomyolysis, Tia (Transient Ischemic Attack) Providers Seen During Your Hospitalizations Provider Role Specialty Primary office phone Breezy Parra MD Attending Provider Emergency Medicine [phone] Hosea Santos DO Attending Provider Internal Medicine [phone] Your Primary Care Physician (PCP) Primary Care Physician Office Phone Office Fax Mike Weiss [phone] Follow-up Information Follow up With Details Comments Contact Info Qamar Carrasco PA-C   [address] [facility] 
[phone] Current Discharge Medication List  
  
START taking these medications Dose & Instructions Dispensing Information Comments Morning Noon Evening Bedtime  
 aspirin delayed-release 81 mg tablet Commonly known as:  ECOTRIN LOW STRENGTH Your next dose is: Today, Tomorrow Other:  _________ Dose:  81 mg Take 1 Tab by mouth daily. Quantity:  30 Tab Refills:  0 CONTINUE these medications which have NOT CHANGED Dose & Instructions Dispensing Information Comments Morning Noon Evening Bedtime  
 atorvastatin 20 mg tablet Commonly known as:  LIPITOR Your next dose is: Today, Tomorrow Other:  _________ Dose:  20 mg Take 1 Tab by mouth daily. Quantity:  90 Tab Refills:  1 [place] Aristides [id number] mg-mg-million Tab Your next dose is: Today, Tomorrow Other:  _________ Take  by mouth. Refills:  0  
     
   
   
   
  
 bethanechol 25 mg tablet Commonly known as:  URECHOLINE Your next dose is: Today, Tomorrow Other:  _________ Dose:  25 mg Take 1 Tab by mouth Before breakfast, lunch, and dinner. Quantity:  270 Tab Refills:  1  
     
   
   
   
  
 cyanocobalamin 1,000 mcg tablet Your next dose is: Today, Tomorrow Other:  _________ Dose:  1000 mcg Take 1,000 mcg by mouth daily. Refills:  0  
     
   
   
   
  
 docusate sodium 100 mg capsule Commonly known as:  Sujey Donate Your next dose is: Today, Tomorrow Other:  _________ Dose:  100 mg Take 100 mg by mouth two (2) times a day. Refills:  0  
     
   
   
   
  
 escitalopram oxalate 20 mg tablet Commonly known as:  Jasmina Heads Your next dose is: Today, Tomorrow Other:  _________ Dose:  20 mg Take 1 Tab by mouth daily. Quantity:  90 Tab Refills:  1 fenofibrate nanocrystallized 145 mg tablet Commonly known as:  [employer] Your next dose is: Today, Tomorrow Other:  _________ Dose:  145 mg Take 1 Tab by mouth daily. Quantity:  90 Tab Refills:  1  
     
   
   
   
  
 ferrous sulfate 325 mg (65 mg iron) tablet Your next dose is: Today, Tomorrow Other:  _________ Take  by mouth Daily (before breakfast). Refills:  0  
     
   
   
   
  
 finasteride 5 mg tablet Commonly known as:  PROSCAR Your next dose is: Today, Tomorrow Other:  _________ Dose:  5 mg Take 1 Tab by mouth daily. Quantity:  90 Tab Refills:  1  
     
   
   
   
  
 gabapentin 300 mg capsule Commonly known as:  NEURONTIN Your next dose is: Today, Tomorrow Other:  _________ Dose:  300 mg Take 1 Cap by mouth daily. Quantity:  90 Cap Refills:  1 HYDROcodone-acetaminophen 5-325 mg per tablet Commonly known as:  Shravan Dolphin Your next dose is: Today, Tomorrow Other:  _________ Dose:  1 Tab Take 1 Tab by mouth every eight (8) hours as needed. Max Daily Amount: 3 Tabs. Quantity:  20 Tab Refills:  0  
     
   
   
   
  
 insulin glargine 100 unit/mL (3 mL) pen Commonly known as:  LANTUS SOLOSTAR Your next dose is: Today, Tomorrow Other:  _________ Dose:  10 Units 10 Units by SubCUTAneous route daily. Quantity:  1 Each Refills:  5  
     
   
   
   
  
 midodrine 5 mg tablet Commonly known as:  Veronica Beckwith Your next dose is: Today, Tomorrow Other:  _________ Dose:  1 Tab Take 1 Tab by mouth three (3) times daily. Refills:  0  
     
   
   
   
  
 pen needle, diabetic 32 gauge x 1/6\" Ndle Commonly known as:  Piedad Shaw Your next dose is: Today, Tomorrow Other:  _________ Dose:  1 Each 1 Each by Does Not Apply route daily. Use daily with victoza Quantity:  100 Pen Needle Refills:  1  
     
   
   
   
  
 tamsulosin 0.4 mg capsule Commonly known as:  FLOMAX Your next dose is: Today, Tomorrow Other:  _________ Dose:  0.4 mg Take 1 Cap by mouth daily. Quantity:  90 Cap Refills:  1  
     
   
   
   
  
 tolterodine ER 4 mg ER capsule Commonly known as:  Thalia Guise Your next dose is: Today, Tomorrow Other:  _________ Dose:  4 mg Take 1 Cap by mouth daily. Quantity:  90 Cap Refills:  1  
     
   
   
   
  
 traMADol 50 mg tablet Commonly known as:  ULTRAM  
   
Your next dose is: Today, Tomorrow Other:  _________ Dose:  50 mg Take 50 mg by mouth every six (6) hours as needed for Pain. Refills:  0  
     
   
   
   
  
 traZODone 100 mg tablet Commonly known as:  Saint Rumps Your next dose is: Today, Tomorrow Other:  _________ Dose:  100 mg Take 1 Tab by mouth nightly. Quantity:  90 Tab Refills:  1 Vit A,C,E-Zinc-Copper Cap capsule Commonly known as:  PRESERVISION Your next dose is: Today, Tomorrow Other:  _________ Dose:  1 Cap Take 1 Cap by mouth. Refills:  0  
     
   
   
   
  
 zolpidem CR 12.5 mg tablet Commonly known as:  AMBIEN CR Your next dose is: Today, Tomorrow Other:  _________ Dose:  12.5 mg Take 1 Tab by mouth nightly as needed for Sleep. Max Daily Amount: 12.5 mg.  
 Quantity:  30 Tab Refills:  3 STOP taking these medications   
 ciprofloxacin HCl 500 mg tablet Commonly known as:  CIPRO varicella zoster vacine live 19,400 unit/0.65 mL Susr injection Commonly known as:  ZOSTAVAX Where to Get Your Medications Information on where to get these meds will be given to you by the nurse or doctor. ! Ask your nurse or doctor about these medications  
  aspirin delayed-release 81 mg tablet Discharge Instructions Rhabdomyolysis: Care Instructions Your Care Instructions When you have rhabdomyolysis (say \"xhd-hqz-bo-AH-alejandra-rachel\"), dying muscle cells cause toxins to build up in the blood. If not treated, it can cause life-threatening damage to the body's organs. It can be caused by many things, such as severe muscle injury, some medicines (like statins), the flu, and certain blood infections. Symptoms may include weak muscles, pain, stiffness, fever, and nausea. Your urine may also be dark. You will get treatment in the hospital. If possible, the doctor will stop the cause of muscle cell death. The doctor will take steps to protect your organs. You may have to stop taking certain medicines if they are the cause of the problem. You will also get treatment to help the kidneys remove the toxins from your blood. This includes plenty of fluids. You may get fluids through a vein (by IV). You may also need dialysis. Follow-up care is a key part of your treatment and safety. Be sure to make and go to all appointments, and call your doctor if you are having problems. It's also a good idea to know your test results and keep a list of the medicines you take. How can you care for yourself at home? · Take pain medicines exactly as directed. ¨ If the doctor gave you a prescription medicine for pain, take it as prescribed. ¨ If you are not taking a prescription pain medicine, ask your doctor if you can take an over-the-counter medicine. · Talk to your doctor about whether you need to stop taking any medicines. Follow your doctor's instructions about stopping medicines. · Drink plenty of fluids, enough so that your urine is light yellow or clear like water.  If you have kidney, heart, or liver disease and have to limit fluids, talk with your doctor before you increase the amount of fluids you drink. When should you call for help? Call your doctor now or seek immediate medical care if: 
· You have new or worse muscle pain. · You have less urine than normal or no urine. · You have new swelling in your arms or feet. · You have blood in your urine. Watch closely for changes in your health, and be sure to contact your doctor if you do not get better as expected. Where can you learn more? Go to http://bobby-jessie.info/. Enter F129 in the search box to learn more about \"Rhabdomyolysis: Care Instructions. \" Current as of: November 20, 2015 Content Version: 11.1 © 1006-8550 [employer]. Care instructions adapted under license by [employer] (which disclaims liability or warranty for this information). If you have questions about a medical condition or this instruction, always ask your healthcare professional. [place] [address] any warranty or liability for your use of this information. Stroke: After Your Visit Your Care Instructions You have had a stroke. Risk factors for stroke include being overweight, smoking, and sedentary lifestyle. This means that the blood flow to a part of your brain was blocked for some time, which damages the nerve cells in that part of the brain. The part of your body controlled by that part of your brain may not function properly now. The brain is an amazing organ that can heal itself to some degree. The stroke you had damaged part of your brain, but other parts of your brain may take over in some way for the damaged areas. You have already started this process. Going home may be hard for you and your family. The more you can try to do for yourself, the better. Remember to take each day one at a time. Follow-up care is a key part of your treatment and safety.  Be sure to make and go to all appointments, and call your doctor if you are having problems. Its also a good idea to know your test results and keep a list of the medicines you take. How can you care for yourself at home? Enter a stroke rehabilitation (rehab) program, if your doctor recommends it. Physical, speech, and occupational therapies can help you manage bathing, dressing, eating, and other basics of daily living. Eat a heart-healthy diet that is low in cholesterol, saturated fat, and salt. Eat lots of fresh fruits and vegetables and foods high in fiber. Increase your activities slowly. Take short rest breaks when you get tired. Gradually increase the amount you walk. Start out by walking a little more than you did the day before. Do not drive until your doctor says it is okay. It is normal to feel sad or depressed after a stroke. If the blues last, talk to your doctor. If you are having problems with urine leakage, go to the bathroom at regular times, including when you first wake up and at bedtime. Also, limit fluids after dinner. If you are constipated, drink plenty of fluids, enough so that your urine is light yellow or clear like water. If you have kidney, heart, or liver disease and have to limit fluids, talk with your doctor before you increase the amount of fluids you drink. Set up a regular time for using the toilet. If you continue to have constipation, your doctor may suggest using a bulking agent, such as Metamucil, or a stool softener, laxative, or enema. Medicines Take your medicines exactly as prescribed. Call your doctor if you think you are having a problem with your medicine. You may be taking several medicines. ACE (angiotensin-converting enzyme) inhibitors, angiotensin II receptor blockers (ARBs), beta-blockers, diuretics (water pills), and calcium channel blockers control your blood pressure. Statins help lower cholesterol. Your doctor may also prescribe medicines for depression, pain, sleep problems, anxiety, or agitation. If your doctor has given you medicine that prevents blood clots, such as warfarin (Coumadin), aspirin combined with extended-release dipyridamole (Aggrenox), clopidogrel (Plavix), or aspirin to prevent another stroke, you should: 
Tell your dentist, pharmacist, and other health professionals that you take these medicines. Watch for unusual bruising or bleeding, such as blood in your urine, red or black stools, or bleeding from your nose or gums. Get regular blood tests to check your clotting time if you are taking Coumadin. Wear medical alert jewelry that says you take blood thinners. You can buy this at most [employer]. Do not take any over-the-counter medicines or herbal products without talking to your doctor first. 
If you take birth control pills or hormone replacement therapy, talk to your doctor about whether they are right for you. For family members and caregivers Make the home safe. Set up a room so that your loved one does not have to climb stairs. Be sure the bathroom is on the same floor. Move throw rugs and furniture that could cause falls, and make sure that the lighting is good. Put grab bars and seats in tubs and showers. Find out what your loved one can do and what he or she needs help with. Try not to do things for your loved one that your loved one can do on his or her own. Help him or her learn and practice new skills. Visit and talk with your loved one often. Try doing activities together that you both enjoy, such as playing cards or board games. Keep in touch with your loved one's friends as much as you can, and encourage them to visit. Take care of yourself. Do not try to do everything yourself. Ask other family members to help. Eat well, get enough rest, and take time to do things that you enjoy. Keep up with your own doctor visits, and make sure to take your medicines regularly. Get out of the house as much as you can. Join a local support group. Find out if you qualify for home health care visits to help with rehab or for adult day care. When should you call for help? Call 911 anytime you think you may need emergency care. For example, call if: 
You have signs of another stroke. These may include: 
Sudden numbness, paralysis, or weakness in your face, arm, or leg, especially on only one side of your body. New problems with walking or balance. Sudden vision changes. Drooling or slurred speech. New problems speaking or understanding simple statements, or you feel confused. A sudden, severe headache that is different from past headaches. Call 911 even if these symptoms go away in a few minutes. You cough up blood. You vomit blood or what looks like coffee grounds. You pass maroon or very bloody stools. Call your doctor now or seek immediate medical care if: 
You have new bruises or blood spots under your skin. You have a nosebleed. Your gums bleed when you brush your teeth. You have blood in your urine. Your stools are black and tarlike or have streaks of blood. You have vaginal bleeding when you are not having your period, or heavy period bleeding. You have new symptoms that may be related to your stroke, such as falls or trouble swallowing. Watch closely for changes in your health, and be sure to contact your doctor if you have any problems. Where can you learn more? Go to [employer].be Enter F674  in the search box to learn more about \"Stroke: After Your Visit\". © 0820-2632 Healthwise, Incorporated. Care instructions adapted under license by Romayne Duster (which disclaims liability or warranty for this information).  This care instruction is for use with your licensed healthcare professional. If you have questions about a medical condition or this instruction, always ask your healthcare professional. Melinda Hamlin disclaims any warranty or liability for your use of this information. Discharge Orders None ACO Transitions of Care Introducing [employer] offers a voluntary care coordination program to provide high quality service and care to [facility] fee-for-service beneficiaries. Diana Abraham was designed to help you enhance your health and well-being through the following services: ? Transitions of Care  support for individuals who are transitioning from one care setting to another (example: Hospital to home). ? Chronic and Complex Care Coordination  support for individuals and caregivers of those with serious or chronic illnesses or with more than one chronic (ongoing) condition and those who take a number of different medications. If you meet specific medical criteria, a [facility] [address] may call you directly to coordinate your care with your primary care physician and your other care providers. For questions about the [facility] programs, please, contact your physicians office. For general questions or additional information about Accountable Care Organizations: 
Please visit www.medicare.gov/acos. html or call 1-800-MEDICARE (7-269.749.6908) TTY users should call 0-883.985.1754. [employer] Announcement We are excited to announce that we are making your provider's discharge notes available to you in Ba[employer]endian. You will see these notes when they are completed and signed by the physician that discharged you from your recent hospital stay. If you have any questions or concerns about any information you see in Baifendian, please call the Health Information Department where you were seen or reach out to your Primary Care Provider for more information about your plan of care. Introducing [facility]! Dear Linda Kahn: Thank you for requesting a Baifendian account.   Our records indicate that you have previously registered for a [employer] account but its currently inactive. Please call our [employer] support line at [phone]. Additional Information If you have questions, please visit the Frequently Asked Questions section of the [employer] website at [email or web address] Remember, MyChart is NOT to be used for urgent needs. For medical emergencies, dial 911. Now available from your iPhone and Android! General Information Please provide this summary of care documentation to your next provider. Patient Signature:  ____________________________________________________________ Date:  ____________________________________________________________  
  
Sulaiman Mawanda Provider Signature:  ____________________________________________________________ Date:  ____________________________________________________________

## 2017-01-24 LAB
ANION GAP BLD CALC-SCNC: 12 MMOL/L (ref 7–16)
BACTERIA SPEC CULT: NORMAL
BACTERIA SPEC CULT: NORMAL
BUN SERPL-MCNC: 32 MG/DL (ref 8–23)
CALCIUM SERPL-MCNC: 8.4 MG/DL (ref 8.3–10.4)
CHLORIDE SERPL-SCNC: 111 MMOL/L (ref 98–107)
CHOLEST SERPL-MCNC: 95 MG/DL
CK SERPL-CCNC: 865 U/L (ref 21–215)
CO2 SERPL-SCNC: 22 MMOL/L (ref 21–32)
CREAT SERPL-MCNC: 2.27 MG/DL (ref 0.8–1.5)
ERYTHROCYTE [DISTWIDTH] IN BLOOD BY AUTOMATED COUNT: 14.7 % (ref 11.9–14.6)
EST. AVERAGE GLUCOSE BLD GHB EST-MCNC: 148 MG/DL
GLUCOSE BLD STRIP.AUTO-MCNC: 118 MG/DL (ref 65–100)
GLUCOSE BLD STRIP.AUTO-MCNC: 119 MG/DL (ref 65–100)
GLUCOSE BLD STRIP.AUTO-MCNC: 148 MG/DL (ref 65–100)
GLUCOSE BLD STRIP.AUTO-MCNC: 161 MG/DL (ref 65–100)
GLUCOSE SERPL-MCNC: 108 MG/DL (ref 65–100)
HBA1C MFR BLD: 6.8 % (ref 4.8–6)
HCT VFR BLD AUTO: 33.9 % (ref 41.1–50.3)
HDLC SERPL-MCNC: 25 MG/DL (ref 40–60)
HDLC SERPL: 3.8 {RATIO}
HGB BLD-MCNC: 10.6 G/DL (ref 13.6–17.2)
LACTATE SERPL-SCNC: 1.1 MMOL/L (ref 0.4–2)
LDLC SERPL CALC-MCNC: 43.6 MG/DL
LIPID PROFILE,FLP: ABNORMAL
MCH RBC QN AUTO: 29 PG (ref 26.1–32.9)
MCHC RBC AUTO-ENTMCNC: 31.3 G/DL (ref 31.4–35)
MCV RBC AUTO: 92.9 FL (ref 79.6–97.8)
MM INDURATION POC: 0 MM (ref 0–5)
PLATELET # BLD AUTO: 315 K/UL (ref 150–450)
PMV BLD AUTO: 11 FL (ref 10.8–14.1)
POTASSIUM SERPL-SCNC: 3.9 MMOL/L (ref 3.5–5.1)
PPD POC: NORMAL NEGATIVE
RBC # BLD AUTO: 3.65 M/UL (ref 4.23–5.67)
SERVICE CMNT-IMP: NORMAL
SODIUM SERPL-SCNC: 145 MMOL/L (ref 136–145)
TRIGL SERPL-MCNC: 132 MG/DL (ref 35–150)
VLDLC SERPL CALC-MCNC: 26.4 MG/DL (ref 6–23)
WBC # BLD AUTO: 5.8 K/UL (ref 4.3–11.1)

## 2017-01-24 PROCEDURE — G8978 MOBILITY CURRENT STATUS: HCPCS

## 2017-01-24 PROCEDURE — 83036 HEMOGLOBIN GLYCOSYLATED A1C: CPT | Performed by: INTERNAL MEDICINE

## 2017-01-24 PROCEDURE — 97166 OT EVAL MOD COMPLEX 45 MIN: CPT

## 2017-01-24 PROCEDURE — 96361 HYDRATE IV INFUSION ADD-ON: CPT

## 2017-01-24 PROCEDURE — G8997 SWALLOW GOAL STATUS: HCPCS

## 2017-01-24 PROCEDURE — 74011000250 HC RX REV CODE- 250: Performed by: INTERNAL MEDICINE

## 2017-01-24 PROCEDURE — 82962 GLUCOSE BLOOD TEST: CPT

## 2017-01-24 PROCEDURE — 80048 BASIC METABOLIC PNL TOTAL CA: CPT | Performed by: INTERNAL MEDICINE

## 2017-01-24 PROCEDURE — 74011636637 HC RX REV CODE- 636/637: Performed by: INTERNAL MEDICINE

## 2017-01-24 PROCEDURE — G8987 SELF CARE CURRENT STATUS: HCPCS

## 2017-01-24 PROCEDURE — G8998 SWALLOW D/C STATUS: HCPCS

## 2017-01-24 PROCEDURE — 97530 THERAPEUTIC ACTIVITIES: CPT

## 2017-01-24 PROCEDURE — 74011250637 HC RX REV CODE- 250/637: Performed by: INTERNAL MEDICINE

## 2017-01-24 PROCEDURE — 99218 HC RM OBSERVATION: CPT

## 2017-01-24 PROCEDURE — A9270 NON-COVERED ITEM OR SERVICE: HCPCS | Performed by: INTERNAL MEDICINE

## 2017-01-24 PROCEDURE — 87641 MR-STAPH DNA AMP PROBE: CPT | Performed by: INTERNAL MEDICINE

## 2017-01-24 PROCEDURE — G8988 SELF CARE GOAL STATUS: HCPCS

## 2017-01-24 PROCEDURE — 92610 EVALUATE SWALLOWING FUNCTION: CPT

## 2017-01-24 PROCEDURE — 96372 THER/PROPH/DIAG INJ SC/IM: CPT

## 2017-01-24 PROCEDURE — 85027 COMPLETE CBC AUTOMATED: CPT | Performed by: INTERNAL MEDICINE

## 2017-01-24 PROCEDURE — 74011250636 HC RX REV CODE- 250/636: Performed by: INTERNAL MEDICINE

## 2017-01-24 PROCEDURE — G8979 MOBILITY GOAL STATUS: HCPCS

## 2017-01-24 PROCEDURE — G8996 SWALLOW CURRENT STATUS: HCPCS

## 2017-01-24 PROCEDURE — 80061 LIPID PANEL: CPT | Performed by: INTERNAL MEDICINE

## 2017-01-24 PROCEDURE — C8929 TTE W OR WO FOL WCON,DOPPLER: HCPCS

## 2017-01-24 PROCEDURE — 82550 ASSAY OF CK (CPK): CPT | Performed by: INTERNAL MEDICINE

## 2017-01-24 PROCEDURE — G0378 HOSPITAL OBSERVATION PER HR: HCPCS

## 2017-01-24 PROCEDURE — 97161 PT EVAL LOW COMPLEX 20 MIN: CPT

## 2017-01-24 PROCEDURE — 83605 ASSAY OF LACTIC ACID: CPT | Performed by: INTERNAL MEDICINE

## 2017-01-24 PROCEDURE — 36415 COLL VENOUS BLD VENIPUNCTURE: CPT | Performed by: INTERNAL MEDICINE

## 2017-01-24 RX ADMIN — MIDODRINE HYDROCHLORIDE 5 MG: 5 TABLET ORAL at 16:24

## 2017-01-24 RX ADMIN — Medication 5 ML: at 05:47

## 2017-01-24 RX ADMIN — ZOLPIDEM TARTRATE 10 MG: 5 TABLET, FILM COATED ORAL at 00:05

## 2017-01-24 RX ADMIN — HEPARIN SODIUM 5000 UNITS: 5000 INJECTION, SOLUTION INTRAVENOUS; SUBCUTANEOUS at 14:43

## 2017-01-24 RX ADMIN — SOLIFENACIN SUCCINATE 10 MG: 10 TABLET, FILM COATED ORAL at 09:14

## 2017-01-24 RX ADMIN — FERROUS SULFATE TAB 325 MG (65 MG ELEMENTAL FE) 325 MG: 325 (65 FE) TAB at 09:14

## 2017-01-24 RX ADMIN — HYDROCODONE BITARTRATE AND ACETAMINOPHEN 1 TABLET: 5; 325 TABLET ORAL at 18:33

## 2017-01-24 RX ADMIN — BISACODYL 5 MG: 5 TABLET, COATED ORAL at 00:05

## 2017-01-24 RX ADMIN — INSULIN LISPRO 2 UNITS: 100 INJECTION, SOLUTION INTRAVENOUS; SUBCUTANEOUS at 12:10

## 2017-01-24 RX ADMIN — TRAZODONE HYDROCHLORIDE 100 MG: 50 TABLET ORAL at 22:18

## 2017-01-24 RX ADMIN — TAMSULOSIN HYDROCHLORIDE 0.4 MG: 0.4 CAPSULE ORAL at 09:14

## 2017-01-24 RX ADMIN — SODIUM CHLORIDE 100 ML/HR: 900 INJECTION, SOLUTION INTRAVENOUS at 17:30

## 2017-01-24 RX ADMIN — CIPROFLOXACIN HYDROCHLORIDE 500 MG: 500 TABLET, FILM COATED ORAL at 22:18

## 2017-01-24 RX ADMIN — FINASTERIDE 5 MG: 5 TABLET, FILM COATED ORAL at 09:14

## 2017-01-24 RX ADMIN — ASPIRIN 81 MG 81 MG: 81 TABLET ORAL at 09:14

## 2017-01-24 RX ADMIN — INSULIN GLARGINE 10 UNITS: 100 INJECTION, SOLUTION SUBCUTANEOUS at 22:17

## 2017-01-24 RX ADMIN — ESCITALOPRAM OXALATE 20 MG: 10 TABLET ORAL at 09:14

## 2017-01-24 RX ADMIN — Medication 5 ML: at 22:18

## 2017-01-24 RX ADMIN — GABAPENTIN 300 MG: 300 CAPSULE ORAL at 09:14

## 2017-01-24 RX ADMIN — Medication 5 ML: at 16:25

## 2017-01-24 RX ADMIN — PERFLUTREN 1 ML: 6.52 INJECTION, SUSPENSION INTRAVENOUS at 13:00

## 2017-01-24 RX ADMIN — HEPARIN SODIUM 5000 UNITS: 5000 INJECTION, SOLUTION INTRAVENOUS; SUBCUTANEOUS at 05:47

## 2017-01-24 RX ADMIN — DOCUSATE SODIUM 100 MG: 100 CAPSULE, LIQUID FILLED ORAL at 16:24

## 2017-01-24 RX ADMIN — DOCUSATE SODIUM 100 MG: 100 CAPSULE, LIQUID FILLED ORAL at 09:14

## 2017-01-24 RX ADMIN — HEPARIN SODIUM 5000 UNITS: 5000 INJECTION, SOLUTION INTRAVENOUS; SUBCUTANEOUS at 22:18

## 2017-01-24 RX ADMIN — MIDODRINE HYDROCHLORIDE 5 MG: 5 TABLET ORAL at 09:14

## 2017-01-24 RX ADMIN — CYANOCOBALAMIN TAB 1000 MCG 1000 MCG: 1000 TAB at 09:14

## 2017-01-24 RX ADMIN — ACETAMINOPHEN 650 MG: 325 TABLET, FILM COATED ORAL at 09:17

## 2017-01-24 RX ADMIN — Medication 5 ML: at 22:19

## 2017-01-24 RX ADMIN — MIDODRINE HYDROCHLORIDE 5 MG: 5 TABLET ORAL at 12:09

## 2017-01-24 NOTE — PROGRESS NOTES
Problem: Mobility Impaired (Adult and Pediatric)  Goal: *Acute Goals and Plan of Care (Insert Text)  STG:  (1.)Mr. Laura Christianson will move from supine to sit and sit to supine , scoot up and down and roll side to side with CONTACT GUARD ASSIST within 7 day(s). (2.)Mr. Laura Christianson will transfer from bed to chair and chair to bed with CONTACT GUARD ASSIST using the least restrictive device within 7 day(s). (3.)Mr. Laura Christianson will ambulate with CONTACT GUARD ASSIST for 25 feet with the least restrictive device within 7 day(s). LTG:  (1.)Mr. Laura Christianson will move from supine to sit and sit to supine , scoot up and down and roll side to side in bed with MODIFIED INDEPENDENCE within 14 day(s). (2.)Mr. Laura Christianson will transfer from bed to chair and chair to bed with MODIFIED INDEPENDENCE using the least restrictive device within 14 day(s). (3.)Mr. Laura Christianson will ambulate with MODIFIED INDEPENDENCE for 100 feet with the least restrictive device within 14 day(s). ________________________________________________________________________________________________      PHYSICAL THERAPY: INITIAL ASSESSMENT, TREATMENT DAY: DAY OF ASSESSMENT, AM 1/24/2017  OBSERVATION: Hospital Day: 2  [place] / Product Type: Medicare /      NAME/AGE/GENDER: Akira Nolasco is a 80 y.o. male   PRIMARY DIAGNOSIS: Acute on chronic renal failure (HCC) <principal problem not specified> <principal problem not specified>        ICD-10: Treatment Diagnosis:       · Generalized Muscle Weakness (M62.81)  · Difficulty in walking, Not elsewhere classified (R26.2)   Precaution/Allergies:  Review of patient's allergies indicates no known allergies. ASSESSMENT:      Mr. Laura Christianson presents supine in bed, agreeable to therapy. He reports no pain at this time. He lives in an apartment connected to his daughter's home.   He has caregivers 2-3 times a week (patient states that more recently he has been having caregivers 4 times a week). He uses a walker to ambulate at all times. He states that he was going to bed and he woke up and he was on the floor. Since this time, he has progressed with regards to generalized weakness. Bed mobility performed at initial evaluation with minimal assistance. He does seem to have some difficulty with memory--asking the same questions 2-3 times. He sat EOB with overall good sitting balance and stood with Jesenia to walker. He declined walking any further than to the chair as he expressed that he hasn't been able to walk since his fall. He walked with Jesenia from EOB around room to chair. Once in chair he remained there at end of session with tray table in front and legs elevated. Roselle Fothergill will benefit from skilled PT (medically necessary) to address decreased strength, decreased balance, decreased functional tolerance, decreased cardiopulmonary endurance affecting participation in basic ADLs and functional tasks. MMT LEs 4/5. B abrasions on knees (L>R). Hx of recent (2016) TKA L LE and shoulder. Vision loss and [place]      This section established at most recent assessment   PROBLEM LIST (Impairments causing functional limitations):  1. Decreased Strength  2. Decreased ADL/Functional Activities  3. Decreased Transfer Abilities  4. Decreased Ambulation Ability/Technique  5. Decreased Balance  6. Increased Pain  7. Decreased Activity Tolerance  8. Increased Fatigue  9. Increased Shortness of Breath  10. Decreased [place]da with Home Exercise Program    INTERVENTIONS PLANNED: (Benefits and precautions of physical therapy have been discussed with the patient.)  1. Balance Exercise  2. Bed Mobility  3. Family Education  4. Gait Training  5. Home Exercise Program (HEP)  6. Manual Therapy  7. Neuromuscular Re-education/Strengthening  8. Range of Motion (ROM)  9. Therapeutic Activites  10. Therapeutic Exercise/Strengthening  11. Transfer Training  12.  Group Therapy      TREATMENT PLAN: Frequency/Duration: 3 times a week for duration of hospital stay  Rehabilitation Potential For Stated Goals: GOOD      RECOMMENDED REHABILITATION/EQUIPMENT: (at time of discharge pending progress): Continue Skilled Therapy. HISTORY:   History of Present Injury/Illness (Reason for Referral):  S/P Fall and generalized weakness  Past Medical History/Comorbidities:   Mr. Calli Caballero  has a past medical history of Arthritis; Bilateral impacted cerumen (8/4/2016); Chronic kidney disease; DDD (degenerative disc disease), cervical; Diabetes ([facility] [address] Hearing abnormally acute (10/26/2015); Hearing deficit; History of cataract surgery (10/26/2015); Hypercholesterolemia; Kidney disease; Macular degeneration; Sleep disorder; SNHL (sensorineural hearing loss) (8/4/2016); and Vision loss (8/4/2016). Mr. Calli Caballero  has a past surgical history that includes cataract removal; prostatectomy; urological; tonsillectomy; other surgical; colonoscopy (2015); knee replacement (Left, 2016); and shoulder replacement. Social History/Living Environment:   Home Environment: Private residence  # Steps to Enter: 0  One/Two Story Residence: One story  Living Alone: No  Support Systems: Family member(s)  Patient Expects to be Discharged to[de-identified] Private residence  Current DME Used/Available at Home: Senthil Gage  Prior Level of Function/Work/Activity:  See assessment      Number of Personal Factors/Comorbidities that affect the Plan of Care: 3+: HIGH COMPLEXITY   EXAMINATION:   Most Recent Physical Functioning:   Gross Assessment:  AROM: Generally decreased, functional (Abrasions to B knees; limited hip flexion sitting EOB)  PROM: Generally decreased, functional  Strength: Generally decreased, functional (4/5 B LE)  Coordination: Generally decreased, functional  Tone: Normal  Sensation: Intact               Posture:  Posture (WDL): Exceptions to WDL  Posture Assessment:  Forward head  Balance:  Sitting: Intact  Standing: Impaired  Standing - Static: Good  Standing - Dynamic : Fair Bed Mobility:  Rolling: Minimum assistance  Supine to Sit: Minimum assistance  Scooting: Minimum assistance  Wheelchair Mobility:     Transfers:  Sit to Stand: Minimum assistance  Stand to Sit: Minimum assistance  Bed to Chair: Minimum assistance  Gait:     Base of Support: Narrowed  Speed/Fanta: Slow  Step Length: Right shortened;Left shortened  Gait Abnormalities: Decreased step clearance  Distance (ft): 8 Feet (ft)  Assistive Device: Walker, rolling  Ambulation - Level of Assistance: Minimal assistance       Body Structures Involved:  1. Eyes and Ears  2. Bones  3. Joints  4. Muscles  5. Ligaments Body Functions Affected:  1. Neuromusculoskeletal  2. Movement Related Activities and Participation Affected:  1. Mobility  2. Self Care   Number of elements that affect the Plan of Care: 4+: HIGH COMPLEXITY   CLINICAL PRESENTATION:   Presentation: Stable and uncomplicated: LOW COMPLEXITY   CLINICAL DECISION MAKIN[address] [place] [id number] AM-PAC 6 Clicks   Basic Mobility Inpatient Short Form  How much difficulty does the patient currently have. .. Unable A Lot A Little None   1. Turning over in bed (including adjusting bedclothes, sheets and blankets)? [ ] 1   [ ] 2   [X] 3   [ ] 4   2. Sitting down on and standing up from a chair with arms ( e.g., wheelchair, bedside commode, etc.)   [ ] 1   [ ] 2   [X] 3   [ ] 4   3. Moving from lying on back to sitting on the side of the bed? [ ] 1   [ ] 2   [X] 3   [ ] 4   How much help from another person does the patient currently need. .. Total A Lot A Little None   4. Moving to and from a bed to a chair (including a wheelchair)? [ ] 1   [ ] 2   [X] 3   [ ] 4   5. Need to walk in hospital room? [ ] 1   [ ] 2   [X] 3   [ ] 4   6. Climbing 3-5 steps with a railing? [ ] 1   [ ] 2   [X] 3   [ ] 4   © , Trustees of [address] [place] [id number], under license to [employer].  All rights reserved    Score:  Initial:18 Most Recent: X (Date: -- ) Interpretation of Tool:  Represents activities that are increasingly more difficult (i.e. Bed mobility, Transfers, Gait). Score 24 23 22-20 19-15 14-10 9-7 6       Modifier CH CI CJ CK CL CM CN         · Mobility - Walking and Moving Around:               - CURRENT STATUS:    CK - 40%-59% impaired, limited or restricted               - GOAL STATUS:           CJ - 20%-39% impaired, limited or restricted               - D/C STATUS:                       ---------------To be determined---------------  [place] / [place] / Product Type: Medicare /       Medical Necessity:     · Skilled intervention continues to be required due to decreased strength, decreased balance, decreased functional tolerance, decreased cardiopulmonary endurance affecting participation in basic ADLs and functional tasks. .  Reason for Services/Other Comments:  · Patient continues to require skilled intervention due to medical complications and patient unable to attend/participate in therapy as expected. Use of outcome tool(s) and clinical judgement create a POC that gives a: Clear prediction of patient's progress: LOW COMPLEXITY                 TREATMENT:   (In addition to Assessment/Re-Assessment sessions the following treatments were rendered)   Pre-treatment Symptoms/Complaints:  No pain. Verbalized understanding of PT role and POC  Pain: Initial:   Pain Intensity 1: 0  Post Session:  0/10      Therapeutic Activity: (    10 minutes): Therapeutic activities including Bed transfers, Chair transfers and Ambulation on level ground to improve mobility, strength and coordination. Required minimal   to promote static and dynamic balance in standing. Treatment/Session Assessment:    · Response to Treatment:  No pain.    · Interdisciplinary Collaboration:  · Physical Therapist  · Registered Nurse  · After treatment position/precautions:  · Up in chair  · Bed/Chair-wheels locked  · Bed in low position  · Call light within reach  · RN notified  · Compliance with Program/Exercises: Will assess as treatment progresses. · Recommendations/Intent for next treatment session: \"Next visit will focus on advancements to more challenging activities and reduction in assistance provided\".   Total Treatment Duration:  PT Patient Time In/Time Out  Time In: 0845  Time Out: [address] [facility] DPT

## 2017-01-24 NOTE — PROGRESS NOTES
Shift assessment complete. Pt resting in bed. No complaints. Safety measures in place. Call light in reach.

## 2017-01-24 NOTE — PROGRESS NOTES
Hospitalist Progress Note    2017  Admit Date: 2017  2:31 PM   NAME: Lisbeth Anderson   :  3/3/1932   MRN:  [id number]   Attending: Ciara Poe DO  PCP:  Gina Norris PA-C    SUBJECTIVE:   Patient pleasant 74C with pmhx of IDDM, CKD, [place], HLP, BPH presents via EMS after falling at home night before. Pt reports that he slipped off edge of bed hitting his head on nightstand and then attempted several times to get up but too weak to get off floor and stayed there until found by grandson this AM. Pt lives in [facility] connected to his daughter's home (who was in [place] this week) and also has caretaker 2-3 days/week Recently treated for UTI with cipro (on day 8/10). Caretaker reports extremely slurred speech that has resolved during ED eval.   Admitted for CVA work-up. MRI brain non acute, Echo/carotid duplex wnl.    1-24 - resting in bed, daughter at bedside. No acute complaints. Review of Systems negative with exception of pertinent positives noted above  PHYSICAL EXAM     Visit Vitals    /55    Pulse 78    Temp 97.7 °F (36.5 °C)    Resp 16    Ht 5' 5\" (1.651 m)    Wt 93.9 kg (207 lb)    SpO2 96%    BMI 34.45 kg/m2      Temp (24hrs), Av.8 °F (36.6 °C), Min:97.4 °F (36.3 °C), Max:98.2 °F (36.8 °C)    Oxygen Therapy  O2 Sat (%): 96 % (17 1137)  Pulse via Oximetry: 74 beats per minute (17 1800)  O2 Device: Room air (17 0200)    Intake/Output Summary (Last 24 hours) at 17 1550  Last data filed at 17 1431   Gross per 24 hour   Intake              720 ml   Output              200 ml   Net              520 ml      General: No acute distress    Lungs:  CTA Bilaterally.    Heart:  Regular rate and rhythm,  No murmur, rub, or gallop  Abdomen: Soft, Non distended, Non tender, Positive bowel sounds  Extremities: No cyanosis, clubbing or edema  Neurologic:  No focal deficits    ASSESSMENT      Active Hospital Problems    Diagnosis Date Noted    Rhabdomyolysis 01/23/2017    TIA (transient ischemic attack) 01/23/2017    Mixed hyperlipidemia 09/09/2016    Acute on chronic renal failure ([facility] [address] 06/03/2016    Controlled type 2 diabetes mellitus with stage 4 chronic kidney disease, with long-term current use of insulin ([facility] [address] 06/03/2016    UTI (urinary tract infection) 05/30/2016    BPH (benign prostatic hyperplasia) 10/27/2015     A/P  - TIA - cont asa. MRI non acute, echo/duplex carotid overall unremarkable  - Rhabdo - cpk down-trending. Cont ivf tonight.  - HLP - holding statins in presence of rhabdo. Resume lipitor at discharge   - Acute/Chronic Renal failure -improved with ivf.   - IDDM - cont current. A1C 6.8  - BPH - resume home meds  - recent UTI - continue cipro d 8/10. No cx done at PCP office. Repeat UA on admit wnl.      DVT Prophylaxis: hep sq    Signed By: Ashley Richter DO     January 24, 2017

## 2017-01-24 NOTE — PROGRESS NOTES
Shift assessment. Pt resting comfortably in bed with family at bedside. Alert and oriented x4. Respirations even and unlabored on room air, no acute distress noted. Dual skin assessment completed with Darian Cano. Skin clean, dry, intact. Scars to bilateral shoulders and knees. Small abrasions to BLE and head. Some redness to bilateral great toes. Redness to groin area, cream applied. Assessment completed as documented. Pt offers no complaints at this time. Call light in reach, pt encouraged to call for assistance. Will continue to monitor.

## 2017-01-24 NOTE — PROGRESS NOTES
Speech language pathology: bedside swallow note: Initial Assessment and Discharge   OBSERVATION PATIENT    NAME/AGE/GENDER: Ignacio Johns is a 80 y.o. male  DATE: 1/24/2017  PRIMARY DIAGNOSIS: Acute on chronic renal failure ([address]       ICD-10: Treatment Diagnosis: dysphagia, unspecified (R13.10)  INTERDISCIPLINARY COLLABORATION: Registered Nurse  PRECAUTIONS/ALLERGIES: Review of patient's allergies indicates no known allergies. ASSESSMENT:Based on the objective data described below, Mr. Sandra Trotter presents with no overt signs or symptoms of aspiration with any consistency assessed. Swallows timely with good laryngeal excursion, clear to cervical auscultation and with clear voicing after swallow. Conversational speech clear and appropriate. Daughter arrived [facility] through assessment and reports speech appears to be at baseline. Discussed with daughter history of thickened liquids and she reports pt has not been on thickener since discharge from this facility in June. Chest xray from this admission clear and from October 2016 clear. No further skilled speech/swallow intervention currently indicated. PLAN OF CARE:   Patient will benefit from skilled intervention to address the following impairments. RECOMMENDATIONS AND PLANNED INTERVENTIONS (Benefits and precautions of therapy have been discussed with the patient.):  · continue prescribed diet  MEDICATIONS:  · With liquid  COMPENSATORY STRATEGIES/MODIFICATIONS INCLUDING:  · Upright for all PO  RECOMMENDED REHABILITATION/EQUIPMENT: (at time of discharge pending progress):   None. SUBJECTIVE:   Patient pleasant and cooperative. Daughter arrived partway through assessment. History of Present Injury/Illness: Mr. Sandra Trotter  has a past medical history of Arthritis; Bilateral impacted cerumen (8/4/2016); Chronic kidney disease; DDD (degenerative disc disease), cervical; Diabetes ([address] Hearing abnormally acute (10/26/2015); Hearing deficit;  History of cataract surgery (10/26/2015); Hypercholesterolemia; Kidney disease; Macular degeneration; Sleep disorder; SNHL (sensorineural hearing loss) (2016); and Vision loss (2016). He also  has a past surgical history that includes cataract removal; prostatectomy; urological; tonsillectomy; other surgical; colonoscopy (2015); knee replacement (Left, 2016); and shoulder replacement. Present Symptoms: CVA vs TIA   Pain Intensity 1: 0  Pain Location 1: Rib cage  Pain Orientation 1: Left, Right  Pain Intervention(s) 1: Medication (see MAR)  Current Medications:   No current facility-administered medications on file prior to encounter. Current Outpatient Prescriptions on File Prior to Encounter   Medication Sig Dispense Refill    [facility] [id number] mg-mg-million tab Take  by mouth.  traMADol (ULTRAM) 50 mg tablet Take 50 mg by mouth every six (6) hours as needed for Pain.  ciprofloxacin HCl (CIPRO) 500 mg tablet Take 1 Tab by mouth two (2) times a day. 20 Tab 0    bethanechol (URECHOLINE) 25 mg tablet Take 1 Tab by mouth Before breakfast, lunch, and dinner. 270 Tab 1    atorvastatin (LIPITOR) 20 mg tablet Take 1 Tab by mouth daily. 90 Tab 1    [] varicella zoster vacine live (ZOSTAVAX) 19,400 unit/0.65 mL susr injection 1 Vial by SubCUTAneous route once for 1 dose. Please fax confirmation of vaccination after administration to [facility] at [phone] 1 Each 0    fenofibrate nanocrystallized (TRICOR) 145 mg tablet Take 1 Tab by mouth daily. 90 Tab 1    zolpidem CR (AMBIEN CR) 12.5 mg tablet Take 1 Tab by mouth nightly as needed for Sleep. Max Daily Amount: 12.5 mg. 30 Tab 3    traZODone (DESYREL) 100 mg tablet Take 1 Tab by mouth nightly. 90 Tab 1    tolterodine ER (DETROL LA) 4 mg ER capsule Take 1 Cap by mouth daily. 90 Cap 1    gabapentin (NEURONTIN) 300 mg capsule Take 1 Cap by mouth daily. 90 Cap 1    tamsulosin (FLOMAX) 0.4 mg capsule Take 1 Cap by mouth daily.  90 Cap 1    finasteride (PROSCAR) 5 mg tablet Take 1 Tab by mouth daily. 90 Tab 1    midodrine (PROAMITINE) 5 mg tablet Take 1 Tab by mouth three (3) times daily.  escitalopram oxalate (LEXAPRO) 20 mg tablet Take 1 Tab by mouth daily. 90 Tab 1    pen needle, diabetic (NOVOFINE PLUS) 32 gauge x 1/6\" ndle 1 Each by Does Not Apply route daily. Use daily with victoza 100 Pen Needle 1    insulin glargine (LANTUS SOLOSTAR) 100 unit/mL (3 mL) pen 10 Units by SubCUTAneous route daily. 1 Each 5    HYDROcodone-acetaminophen (NORCO) 5-325 mg per tablet Take 1 Tab by mouth every eight (8) hours as needed. Max Daily Amount: 3 Tabs. 20 Tab 0    cyanocobalamin 1,000 mcg tablet Take 1,000 mcg by mouth daily.  ferrous sulfate 325 mg (65 mg iron) tablet Take  by mouth Daily (before breakfast).  Vit A,C,E-Zinc-Copper (PRESERVISION) cap capsule Take 1 Cap by mouth.  docusate sodium (COLACE) 100 mg capsule Take 100 mg by mouth two (2) times a day. Current Dietary Status:  Regular textures, thin liquids    Social History/Home Situation:    Home Environment: Private residence  # Steps to Enter: 0  One/Two Story Residence: One story  Living Alone: No  Support Systems: Family member(s)  Patient Expects to be Discharged to[de-identified] Private residence  Current DME Used/Available at Home: Walker  OBJECTIVE:   Respiratory Status:  Room air     CXR Results:negative  MRI/CT Results:MRI pending    Cognitive and Communication Status:  Neurologic State: Alert  Orientation Level: Appropriate for age  Cognition: Appropriate decision making  Perception: Appears intact  Perseveration: No perseveration noted  Safety/Judgement: Awareness of environment    BEDSIDE SWALLOW EVALUATION  Oral Assessment:  Oral Assessment  Labial: No impairment  Dentition: Intact  Lingual: No impairment  Velum: No impairment  P.O. Trials:  Patient Position: upright in chair    The patient was given the following:   Consistency Presented: Thin liquid; Solid;Puree;Mixed consistency  How Presented: Self-fed/presented;SLP-fed/presented;Cup/sip;Cup/gulp; Spoon;Straw;Successive swallows    ORAL PHASE:  Bolus Acceptance: No impairment  Bolus Formation/Control: No impairment  Propulsion: No impairment     Oral Residue: None    PHARYNGEAL PHASE:  Initiation of Swallow: No impairment  Laryngeal Elevation: Functional  Aspiration Signs/Symptoms: None  Vocal Quality: No impairment           Pharyngeal Phase Characteristics: No impairment, issues, or problems     OTHER OBSERVATIONS:  Rate/bite size: WNL   Endurance: WNL      Tool Used: Dysphagia Outcome and Severity Scale (FLORIN)    Score Comments   Normal Diet  [x] 7 With no strategies or extra time needed   Functional Swallow  [] 6 May have mild oral or pharyngeal delay       Mild Dysphagia    [] 5 Which may require one diet consistency restricted (those who demonstrate penetration which is entirely cleared on MBS would be included)   Mild-Moderate Dysphagia  [] 4 With 1-2 diet consistencies restricted       Moderate Dysphagia  [] 3 With 2 or more diet consistencies restricted       Moderately Severe Dysphagia  [] 2 With partial PO strategies (trials with ST only)       Severe Dysphagia  [] 1 With inability to tolerate any PO safely          Score:  Initial: 7 Most Recent: X (Date: -- )   Interpretation of Tool: The Dysphagia Outcome and Severity Scale (FLORIN) is a simple, easy-to-use, 7-point scale developed to systematically rate the functional severity of dysphagia based on objective assessment and make recommendations for diet level, independence level, and type of nutrition. Score 7 6 5 4 3 2 1   Modifier CH CI CJ CK CL CM CN   ?  Swallowing:     - CURRENT STATUS: CH - 0% impaired, limited or restricted    - GOAL STATUS:  CH - 0% impaired, limited or restricted    - D/C STATUS:  CH - 0% impaired, limited or restricted  [place] / Product Type: Medicare /     TREATMENT:    (In addition to Assessment/Re-Assessment sessions the following treatments were rendered)  Assessment/Reassessment only, no treatment provided today  __________________________________________________________________________________  Safety:   After treatment position/precautions:  · Up in chair  · RN notified  · Family at bedside  Treatment Assessment:  No further skilled ST intervention indicated.     Total Treatment Duration:  Time In: 0978  Time Out: 1240 [facility], [place], CCC-SLP

## 2017-01-24 NOTE — PROGRESS NOTES
Problem: Self Care Deficits Care Plan (Adult)  Goal: *Acute Goals and Plan of Care (Insert Text)  1. Patient will perform grooming and upper body dressing with supervision within 7 days with equipment as needed. 2. Patient will perform lower body dressing with minimal assistance within 7 days with equipment as needed. 3. Patient will perform bathing and toileting with supervision within 7 days with equipment as needed. 4. Patient will perform toilet transfer with supervision within 7 days with equipment as needed. 5. Pt will participate in B UE therapeutic exercises for 8 minutes with 3 rest breaks within 7 days. OCCUPATIONAL THERAPY: Initial Assessment and AM 1/24/2017  OBSERVATION: Hospital Day: 2  [place] / Product Type: Medicare /      NAME/AGE/GENDER: Ananda Lakhani is a 80 y.o. male   PRIMARY DIAGNOSIS:  Acute on chronic renal failure (HCC) <principal problem not specified> <principal problem not specified>        ICD-10: Treatment Diagnosis:        · Stiffness of Right Shoulder, Not elsewhere classified (M25.611)  · Generalized Muscle Weakness (M62.81)  · History of falling (Z91.81)   Precautions/Allergies:        Fall, Review of patient's allergies indicates no known allergies. ASSESSMENT:      Mr. Korina Hayward admitted with Chronic Renal Failure and s/p TIA. Patient lives in basement of daughter's house. Patient has a lift chair to take him upstairs. Patient also owns a rollator, RW, TTB, and has grab bars in bathroom. He has a caregiver that assists him 3 x's/week. Patient able to ambulate using rollator prior to admit. He went out to eat with his caregiver using his rollator to ambulate the day before he was admitted. Patient able to dress himself with set up assistance prior to admit and caregiver assisted patient with bath prior to admit. Pt sitting in chair upon entrance.   Patient fatigued from PT earlier this am and patient required moderate assistance for sit to stand using RW. Patient ambulated approximately 5 feet using RW and then sat with minimal assistance. Patient functioning below baseline. Patient to benefit from OT to maximize ADL performance. Recommend Post Acute Rehab Services. Cont OT per tx plan. This section established at most recent assessment   PROBLEM LIST (Impairments causing functional limitations):  1. Decreased Strength  2. Decreased ADL/Functional Activities  3. Decreased Transfer Abilities  4. Decreased Ambulation Ability/Technique  5. Decreased Balance  6. Decreased Activity Tolerance  7. Increased Fatigue  8. Decreased Flexibility/Joint Mobility  9. Edema/Girth  10. Decreased [place] with Home Exercise Program    INTERVENTIONS PLANNED: (Benefits and precautions of occupational therapy have been discussed with the patient.)  1. Activities of daily living training  2. Adaptive equipment training  3. Balance training  4. Clothing management  5. Cognitive training  6. Donning&doffing training  7. Group therapy  8. Hygiene training  9. Neuromuscular re-eduation  10. Re-evaluation  11. Sensory reintergration training  12. Theraputic activity  13. Theraputic exercise         TREATMENT PLAN: Frequency/Duration: Follow patient 3 x/s week to address above goals. Rehabilitation Potential For Stated Goals: GOOD      RECOMMENDED REHABILITATION/EQUIPMENT: (at time of discharge pending progress): Continue Skilled Therapy and Rehab. OCCUPATIONAL PROFILE AND HISTORY:   History of Present Injury/Illness (Reason for Referral):  Patient pleasant 84M with pmhx of IDDM, CKD, [place], HLP, BPH presents via EMS after falling at home night before.  Pt reports that he slipped off edge of bed hitting his head on nightstand and then attempted several times to get up but too weak to get off floor and stayed there until found by grandson this AM. Pt lives in [facility] connected to his daughter's home (who was in [place] this week) and also has caretaker 2-3 days/week who assists in history today. Recently treated for UTI with cipro (on day 7/10). Caretaker reports extremely slurred speech that has resolved during ED eval.   ED w/u - CT head non acute, CPK 1200, Cr 2.7 (baseline 1.7 to 2.2). Hospitalist asked to admit for TIA vs CVA  Past Medical History/Comorbidities:   Mr. González Kahn  has a past medical history of Arthritis; Bilateral impacted cerumen (8/4/2016); Chronic kidney disease; DDD (degenerative disc disease), cervical; Diabetes ([facility] [address] Hearing abnormally acute (10/26/2015); Hearing deficit; History of cataract surgery (10/26/2015); Hypercholesterolemia; Kidney disease; Macular degeneration; Sleep disorder; SNHL (sensorineural hearing loss) (8/4/2016); and Vision loss (8/4/2016). Mr. González Kahn  has a past surgical history that includes cataract removal; prostatectomy; urological; tonsillectomy; other surgical; colonoscopy (2015); knee replacement (Left, 2016); and shoulder replacement.   Social History/Living Environment:   Home Environment: Private residence  # Steps to Enter: 0  One/Two Story Residence: One story  Living Alone: No  Support Systems: Family member(s)  Patient Expects to be Discharged to[de-identified] Private residence  Current DME Used/Available at Home: Grab bars, Walker, rollator, Walker, rolling, Tub transfer bench, Lift chair  Tub or Shower Type: Tub  Prior Level of Function/Work/Activity:  Patient able to perform ADLs with supervision to modified independence prior to admit/      Number of Personal Factors/Comorbidities that affect the Plan of Care  · Arthritis  · DDD (cervical)  · TIA: Expanded review of therapy/medical records (1-2):  MODERATE COMPLEXITY   ASSESSMENT OF OCCUPATIONAL PERFORMANCE[de-identified]   Activities of Daily Living:           Basic ADLs (From Assessment) Complex ADLs (From Assessment)   Basic ADL  Feeding: Setup  Oral Facial Hygiene/Grooming: Setup  Bathing: Minimum assistance  Upper Body Dressing: Contact guard assistance  Lower Body Dressing: Moderate assistance  Toileting: Maximum assistance Instrumental ADL  Meal Preparation: Total assistance  Homemaking: Total assistance  Medication Management: Minimum assistance   Grooming/Bathing/Dressing Activities of Daily Living     Cognitive Retraining  Safety/Judgement: Awareness of environment; Fall prevention                       Bed/Mat Mobility  Rolling: Minimum assistance  Supine to Sit: Minimum assistance  Sit to Stand: Moderate assistance (pt fatigued)  Bed to Chair: Minimum assistance  Scooting: Minimum assistance          Most Recent Physical Functioning:   Gross Assessment:                  Posture:  Posture (WDL): Exceptions to WDL  Posture Assessment: Forward head  Balance:  Sitting: Intact  Standing: Impaired  Standing - Static: Good  Standing - Dynamic : Fair Bed Mobility:  Rolling: Minimum assistance  Supine to Sit: Minimum assistance  Scooting: Minimum assistance  Wheelchair Mobility:     Transfers:  Sit to Stand: Moderate assistance (pt fatigued)  Stand to Sit: Minimum assistance  Bed to Chair: Minimum assistance                    Patient Vitals for the past 6 hrs:       BP SpO2 Pulse   01/24/17 1137 115/55 96 % 78        Mental Status  Neurologic State: Alert  Orientation Level: Appropriate for age, Oriented to person, Oriented to place, Oriented to time  Cognition: Appropriate for age attention/concentration, Appropriate decision making  Perception: Appears intact  Perseveration: No perseveration noted  Safety/Judgement: Awareness of environment, Fall prevention                               Physical Skills Involved:  1. Range of Motion  2. Balance  3. Mobility  4. Strength  5. Endurance Cognitive Skills Affected (resulting in the inability to perform in a timely and safe manner):  1. Problem Solving  2. Remembering Psychosocial Skills Affected:  1. Habits  2.  Routines and Behaviors   Number of elements that affect the Plan of Care: 3-5:  MODERATE COMPLEXITY CLINICAL DECISION MAKIN[address] [place] [address] AM-PAC 6 Clicks   Basic Mobility Inpatient Short Form  How much help from another person does the patient currently need. .. Total A Lot A Little None   1. Putting on and taking off regular lower body clothing?   [ ] 1   [X] 2   [ ] 3   [ ] 4   2. Bathing (including washing, rinsing, drying)? [ ] 1   [ ] 2   [X] 3   [ ] 4   3. Toileting, which includes using toilet, bedpan or urinal?   [ ] 1   [X] 2   [ ] 3   [ ] 4   4. Putting on and taking off regular upper body clothing?   [ ] 1   [ ] 2   [X] 3   [ ] 4   5. Taking care of personal grooming such as brushing teeth? [ ] 1   [ ] 2   [X] 3   [ ] 4   6. Eating meals? [ ] 1   [ ] 2   [X] 3   [ ] 4   © , Trustees of [address] [place] [address], under license to [employer]. All rights reserved    Score:  Initial: CK Most Recent: X (Date: -- )     Interpretation of Tool:  Represents activities that are increasingly more difficult (i.e. Bed mobility, Transfers, Gait). Score 24 23 22-20 19-15 14-10 9-7 6       Modifier CH CI CJ CK CL CM CN         · Self Care:               - CURRENT STATUS:    CK - 40%-59% impaired, limited or restricted               - GOAL STATUS:           CJ - 20%-39% impaired, limited or restricted               - D/C STATUS:                       ---------------To be determined---------------  [place] / Product Type: Medicare /       Medical Necessity:     · Patient demonstrates good rehab potential due to higher previous functional level. Reason for Services/Other Comments:  · Patient continues to require skilled intervention due to patient's inability to take care of self.    Use of outcome tool(s) and clinical judgement create a POC that gives a: MODERATE COMPLEXITY             TREATMENT:   (In addition to Assessment/Re-Assessment sessions the following treatments were rendered)      Pre-treatment Symptoms/Complaints:    Pain: Initial:   Pain Intensity 1: 0 0 Post Session:  0      Assessment/Reassessment only, no treatment provided today     Treatment/Session Assessment:         Response to Treatment:  Patient tolerated tx well. Interdisciplinary Collaboration:   · Physical Therapist  · Registered Nurse  · Certified Nursing Assistant/Patient Care Technician     After treatment position/precautions:   · Up in chair  · Bed/Chair-wheels locked  · Bed in low position  · Call light within reach  · RN notified  · Family at bedside     Compliance with Program/Exercises: compliant all of the time. Recommendations/Intent for next treatment session:   \"Next visit will focus on advancements to more challenging activities and reduction in assistance provided\"       Total Treatment Duration:  OT Patient Time In/Time Out  Time In: [address]  Time Out: [address], OT

## 2017-01-25 ENCOUNTER — APPOINTMENT (OUTPATIENT)
Dept: GENERAL RADIOLOGY | Age: 82
End: 2017-01-25
Attending: INTERNAL MEDICINE
Payer: MEDICARE

## 2017-01-25 LAB
ANION GAP BLD CALC-SCNC: 9 MMOL/L (ref 7–16)
BUN SERPL-MCNC: 27 MG/DL (ref 8–23)
CALCIUM SERPL-MCNC: 8.4 MG/DL (ref 8.3–10.4)
CHLORIDE SERPL-SCNC: 110 MMOL/L (ref 98–107)
CK SERPL-CCNC: 495 U/L (ref 21–215)
CO2 SERPL-SCNC: 24 MMOL/L (ref 21–32)
CREAT SERPL-MCNC: 2.07 MG/DL (ref 0.8–1.5)
GLUCOSE BLD STRIP.AUTO-MCNC: 108 MG/DL (ref 65–100)
GLUCOSE BLD STRIP.AUTO-MCNC: 138 MG/DL (ref 65–100)
GLUCOSE BLD STRIP.AUTO-MCNC: 143 MG/DL (ref 65–100)
GLUCOSE BLD STRIP.AUTO-MCNC: 162 MG/DL (ref 65–100)
GLUCOSE SERPL-MCNC: 107 MG/DL (ref 65–100)
POTASSIUM SERPL-SCNC: 4.2 MMOL/L (ref 3.5–5.1)
SODIUM SERPL-SCNC: 143 MMOL/L (ref 136–145)

## 2017-01-25 PROCEDURE — 82550 ASSAY OF CK (CPK): CPT | Performed by: INTERNAL MEDICINE

## 2017-01-25 PROCEDURE — 82962 GLUCOSE BLOOD TEST: CPT

## 2017-01-25 PROCEDURE — 72070 X-RAY EXAM THORAC SPINE 2VWS: CPT

## 2017-01-25 PROCEDURE — A9270 NON-COVERED ITEM OR SERVICE: HCPCS | Performed by: INTERNAL MEDICINE

## 2017-01-25 PROCEDURE — 96372 THER/PROPH/DIAG INJ SC/IM: CPT

## 2017-01-25 PROCEDURE — 74011250637 HC RX REV CODE- 250/637: Performed by: INTERNAL MEDICINE

## 2017-01-25 PROCEDURE — 74011636637 HC RX REV CODE- 636/637: Performed by: INTERNAL MEDICINE

## 2017-01-25 PROCEDURE — 72100 X-RAY EXAM L-S SPINE 2/3 VWS: CPT

## 2017-01-25 PROCEDURE — G0378 HOSPITAL OBSERVATION PER HR: HCPCS

## 2017-01-25 PROCEDURE — 74011250636 HC RX REV CODE- 250/636: Performed by: INTERNAL MEDICINE

## 2017-01-25 PROCEDURE — 96361 HYDRATE IV INFUSION ADD-ON: CPT

## 2017-01-25 PROCEDURE — 36415 COLL VENOUS BLD VENIPUNCTURE: CPT | Performed by: INTERNAL MEDICINE

## 2017-01-25 PROCEDURE — 80048 BASIC METABOLIC PNL TOTAL CA: CPT | Performed by: INTERNAL MEDICINE

## 2017-01-25 PROCEDURE — G8980 MOBILITY D/C STATUS: HCPCS

## 2017-01-25 PROCEDURE — 97530 THERAPEUTIC ACTIVITIES: CPT

## 2017-01-25 PROCEDURE — G8979 MOBILITY GOAL STATUS: HCPCS

## 2017-01-25 PROCEDURE — 99218 HC RM OBSERVATION: CPT

## 2017-01-25 RX ORDER — MUPIROCIN 20 MG/G
OINTMENT TOPICAL 2 TIMES DAILY
Status: DISCONTINUED | OUTPATIENT
Start: 2017-01-25 | End: 2017-01-26 | Stop reason: HOSPADM

## 2017-01-25 RX ADMIN — CIPROFLOXACIN HYDROCHLORIDE 500 MG: 500 TABLET, FILM COATED ORAL at 22:14

## 2017-01-25 RX ADMIN — TRAMADOL HYDROCHLORIDE 50 MG: 50 TABLET, FILM COATED ORAL at 11:30

## 2017-01-25 RX ADMIN — HYDROCODONE BITARTRATE AND ACETAMINOPHEN 1 TABLET: 5; 325 TABLET ORAL at 16:42

## 2017-01-25 RX ADMIN — Medication 5 ML: at 22:18

## 2017-01-25 RX ADMIN — HEPARIN SODIUM 5000 UNITS: 5000 INJECTION, SOLUTION INTRAVENOUS; SUBCUTANEOUS at 22:14

## 2017-01-25 RX ADMIN — HEPARIN SODIUM 5000 UNITS: 5000 INJECTION, SOLUTION INTRAVENOUS; SUBCUTANEOUS at 05:34

## 2017-01-25 RX ADMIN — SOLIFENACIN SUCCINATE 10 MG: 10 TABLET, FILM COATED ORAL at 08:36

## 2017-01-25 RX ADMIN — MIDODRINE HYDROCHLORIDE 5 MG: 5 TABLET ORAL at 11:30

## 2017-01-25 RX ADMIN — HYDROCODONE BITARTRATE AND ACETAMINOPHEN 1 TABLET: 5; 325 TABLET ORAL at 22:18

## 2017-01-25 RX ADMIN — MIDODRINE HYDROCHLORIDE 5 MG: 5 TABLET ORAL at 08:36

## 2017-01-25 RX ADMIN — DOCUSATE SODIUM 100 MG: 100 CAPSULE, LIQUID FILLED ORAL at 08:36

## 2017-01-25 RX ADMIN — TAMSULOSIN HYDROCHLORIDE 0.4 MG: 0.4 CAPSULE ORAL at 08:36

## 2017-01-25 RX ADMIN — ESCITALOPRAM OXALATE 20 MG: 10 TABLET ORAL at 08:36

## 2017-01-25 RX ADMIN — HYDROCODONE BITARTRATE AND ACETAMINOPHEN 1 TABLET: 5; 325 TABLET ORAL at 06:18

## 2017-01-25 RX ADMIN — Medication 5 ML: at 16:43

## 2017-01-25 RX ADMIN — INSULIN GLARGINE 10 UNITS: 100 INJECTION, SOLUTION SUBCUTANEOUS at 22:15

## 2017-01-25 RX ADMIN — CYANOCOBALAMIN TAB 1000 MCG 1000 MCG: 1000 TAB at 08:36

## 2017-01-25 RX ADMIN — Medication 5 ML: at 05:34

## 2017-01-25 RX ADMIN — ZOLPIDEM TARTRATE 10 MG: 5 TABLET, FILM COATED ORAL at 22:24

## 2017-01-25 RX ADMIN — MUPIROCIN: 20 OINTMENT TOPICAL at 16:44

## 2017-01-25 RX ADMIN — FERROUS SULFATE TAB 325 MG (65 MG ELEMENTAL FE) 325 MG: 325 (65 FE) TAB at 08:36

## 2017-01-25 RX ADMIN — DOCUSATE SODIUM 100 MG: 100 CAPSULE, LIQUID FILLED ORAL at 16:42

## 2017-01-25 RX ADMIN — HYDROCODONE BITARTRATE AND ACETAMINOPHEN 1 TABLET: 5; 325 TABLET ORAL at 00:17

## 2017-01-25 RX ADMIN — ASPIRIN 81 MG 81 MG: 81 TABLET ORAL at 08:36

## 2017-01-25 RX ADMIN — GABAPENTIN 300 MG: 300 CAPSULE ORAL at 08:36

## 2017-01-25 RX ADMIN — TRAZODONE HYDROCHLORIDE 100 MG: 50 TABLET ORAL at 22:14

## 2017-01-25 RX ADMIN — HEPARIN SODIUM 5000 UNITS: 5000 INJECTION, SOLUTION INTRAVENOUS; SUBCUTANEOUS at 16:42

## 2017-01-25 RX ADMIN — FINASTERIDE 5 MG: 5 TABLET, FILM COATED ORAL at 08:36

## 2017-01-25 RX ADMIN — SODIUM CHLORIDE 100 ML/HR: 900 INJECTION, SOLUTION INTRAVENOUS at 16:44

## 2017-01-25 RX ADMIN — MIDODRINE HYDROCHLORIDE 5 MG: 5 TABLET ORAL at 16:43

## 2017-01-25 RX ADMIN — INSULIN LISPRO 2 UNITS: 100 INJECTION, SOLUTION INTRAVENOUS; SUBCUTANEOUS at 22:16

## 2017-01-25 RX ADMIN — MUPIROCIN: 20 OINTMENT TOPICAL at 11:30

## 2017-01-25 NOTE — PROGRESS NOTES
Pt resting in bed, alert and oriented x4. Respirations even and unlabored on RA. PT complaint of pain to rib cage. Recent pain medication given, see MAR. IVF @ 100ml/hr to rt ac. Door open, call bell in reach.

## 2017-01-25 NOTE — PROGRESS NOTES
Shift assessment complete. Pt resting quietly in bed. Alert and oriented x4. No signs of acute distress. Pt complaining of pain in lower back. Call light within reach. Pt instructed to call for assistance.

## 2017-01-25 NOTE — PROGRESS NOTES
Pt resting comfortably in bed. No signs of acute distress noted. Resp even and unlabored. Call light within reach.

## 2017-01-25 NOTE — PROGRESS NOTES
Problem: Mobility Impaired (Adult and Pediatric)  Goal: *Acute Goals and Plan of Care (Insert Text)  STG:  (1.)Mr. Calli Caballero will move from supine to sit and sit to supine , scoot up and down and roll side to side with CONTACT GUARD ASSIST within 7 day(s). (2.)Mr. Calli Caballero will transfer from bed to chair and chair to bed with CONTACT GUARD ASSIST using the least restrictive device within 7 day(s). (3.)Mr. Calli Caballero will ambulate with CONTACT GUARD ASSIST for 25 feet with the least restrictive device within 7 day(s). LTG:  (1.)Mr. Calli Caballero will move from supine to sit and sit to supine , scoot up and down and roll side to side in bed with MODIFIED INDEPENDENCE within 14 day(s). (2.)Mr. Calli Caballero will transfer from bed to chair and chair to bed with MODIFIED INDEPENDENCE using the least restrictive device within 14 day(s). (3.)Mr. Calli Caballero will ambulate with MODIFIED INDEPENDENCE for 100 feet with the least restrictive device within 14 day(s). ________________________________________________________________________________________________      PHYSICAL THERAPY: Daily Note, Treatment Day: 1st and PM 1/25/2017  OBSERVATION: Hospital Day: 3  [place] / Product Type: Medicare /      NAME/AGE/GENDER: Henry Dowd is a 80 y.o. male   PRIMARY DIAGNOSIS: Acute on chronic renal failure (HCC) <principal problem not specified> <principal problem not specified>        ICD-10: Treatment Diagnosis:       · Generalized Muscle Weakness (M62.81)  · Difficulty in walking, Not elsewhere classified (R26.2)   Precaution/Allergies:  Review of patient's allergies indicates no known allergies. ASSESSMENT:      Mr. Calli Caballero presents supine in bed, agreeable to therapy with encouragement. MD requested to see patient post xray results (see chart). He reports sharp pain in \"rib cage\" when attempting to move in bed or transfer.  Patient required minimal assist for all mobility and would wince in pain at times when moving. Patient was able to transfer to standing with moderate assist initially, then sat back down for adjustment of rolling walker. Patient then stood again with minimal assist and ambulated to recliner chair only 2 feet away from bed. Patient commented back pain more intense and reported feeling more unsteady. Patient positioned in recliner with needs in reach. Will continue to treat as able. Discussed patient with Case Management [facility]) as patient would need more assistance at home due to back pain. [facility] if [facility] is the only option and increase in assistance level from at home Caregiver as he would be unsafe to be alone in the home. Mr. Darlin Nash was discharged from our facility before further treatment could be provided in this setting. This section established at most recent assessment   PROBLEM LIST (Impairments causing functional limitations):  1. Decreased Strength  2. Decreased ADL/Functional Activities  3. Decreased Transfer Abilities  4. Decreased Ambulation Ability/Technique  5. Decreased Balance  6. Increased Pain  7. Decreased Activity Tolerance  8. Increased Fatigue  9. Increased Shortness of Breath  10. Decreased [place] with Home Exercise Program    INTERVENTIONS PLANNED: (Benefits and precautions of physical therapy have been discussed with the patient.)  1. Balance Exercise  2. Bed Mobility  3. Family Education  4. Gait Training  5. Home Exercise Program (HEP)  6. Manual Therapy  7. Neuromuscular Re-education/Strengthening  8. Range of Motion (ROM)  9. Therapeutic Activites  10. Therapeutic Exercise/Strengthening  11. Transfer Training  12. Group Therapy      TREATMENT PLAN: Frequency/Duration: 3 times a week for duration of hospital stay  Rehabilitation Potential For Stated Goals: GOOD      RECOMMENDED REHABILITATION/EQUIPMENT: (at time of discharge pending progress): Continue Skilled Therapy.                    HISTORY: History of Present Injury/Illness (Reason for Referral):  S/P Fall and generalized weakness  Past Medical History/Comorbidities:   Mr. Jaiden Denny  has a past medical history of Arthritis; Bilateral impacted cerumen (8/4/2016); Chronic kidney disease; DDD (degenerative disc disease), cervical; Diabetes ([facility] [address] Hearing abnormally acute (10/26/2015); Hearing deficit; History of cataract surgery (10/26/2015); Hypercholesterolemia; Kidney disease; Macular degeneration; Sleep disorder; SNHL (sensorineural hearing loss) (8/4/2016); and Vision loss (8/4/2016). Mr. Jaiden Denny  has a past surgical history that includes cataract removal; prostatectomy; urological; tonsillectomy; other surgical; colonoscopy (2015); knee replacement (Left, 2016); and shoulder replacement. Social History/Living Environment:   Home Environment: Private residence  # Steps to Enter: 0  One/Two Story Residence: One story  Living Alone: No  Support Systems: Family member(s)  Patient Expects to be Discharged to[de-identified] Private residence  Current DME Used/Available at Home: Grab bars, Walker, rollator, Walker, rolling, Tub transfer bench, Lift chair  Tub or Shower Type: Tub  Prior Level of Function/Work/Activity:  See assessment      Number of Personal Factors/Comorbidities that affect the Plan of Care: 3+: HIGH COMPLEXITY   EXAMINATION:   Most Recent Physical Functioning:   Gross Assessment:                  Posture:     Balance:  Sitting: Intact  Standing: Impaired  Standing - Static: Constant support; Fair  Standing - Dynamic : Poor Bed Mobility:  Rolling: Minimum assistance  Supine to Sit: Minimum assistance  Wheelchair Mobility:     Transfers:  Sit to Stand: Minimum assistance  Stand to Sit: Minimum assistance  Bed to Chair: Minimum assistance  Gait:     Base of Support: Narrowed  Speed/Fanta: Slow  Step Length: Right shortened;Left shortened  Gait Abnormalities: Antalgic;Decreased step clearance; Path deviations  Distance (ft): 4 Feet (ft)  Assistive Device: Walker, rolling  Ambulation - Level of Assistance: Minimal assistance       Body Structures Involved:  1. Eyes and Ears  2. Bones  3. Joints  4. Muscles  5. Ligaments Body Functions Affected:  1. Neuromusculoskeletal  2. Movement Related Activities and Participation Affected:  1. Mobility  2. Self Care   Number of elements that affect the Plan of Care: 4+: HIGH COMPLEXITY   CLINICAL PRESENTATION:   Presentation: Stable and uncomplicated: LOW COMPLEXITY   CLINICAL DECISION MAKIN[address] [place] [id number] AM-PAC 6 Clicks   Basic Mobility Inpatient Short Form  How much difficulty does the patient currently have. .. Unable A Lot A Little None   1. Turning over in bed (including adjusting bedclothes, sheets and blankets)? [ ] 1   [ ] 2   [X] 3   [ ] 4   2. Sitting down on and standing up from a chair with arms ( e.g., wheelchair, bedside commode, etc.)   [ ] 1   [ ] 2   [X] 3   [ ] 4   3. Moving from lying on back to sitting on the side of the bed? [ ] 1   [ ] 2   [X] 3   [ ] 4   How much help from another person does the patient currently need. .. Total A Lot A Little None   4. Moving to and from a bed to a chair (including a wheelchair)? [ ] 1   [ ] 2   [X] 3   [ ] 4   5. Need to walk in hospital room? [ ] 1   [ ] 2   [X] 3   [ ] 4   6. Climbing 3-5 steps with a railing? [ ] 1   [ ] 2   [X] 3   [ ] 4   © , Trustees of [address] [place] [address], under license to [employer]. All rights reserved    Score:  Initial:18 Most Recent: X (Date: -- )     Interpretation of Tool:  Represents activities that are increasingly more difficult (i.e. Bed mobility, Transfers, Gait).        Score 24 23 22-20 19-15 14-10 9-7 6       Modifier CH CI CJ CK CL CM CN         · Mobility - Walking and Moving Around:               - CURRENT STATUS:    CK - 40%-59% impaired, limited or restricted               - GOAL STATUS:           CJ - 20%-39% impaired, limited or restricted               - D/C STATUS: CK - 40%-59% impaired, limited or restricted  [place] / Product Type: Medicare /       Medical Necessity:     · Skilled intervention continues to be required due to decreased strength, decreased balance, decreased functional tolerance, decreased cardiopulmonary endurance affecting participation in basic ADLs and functional tasks. .  Reason for Services/Other Comments:  · Patient continues to require skilled intervention due to medical complications and patient unable to attend/participate in therapy as expected. Use of outcome tool(s) and clinical judgement create a POC that gives a: Clear prediction of patient's progress: LOW COMPLEXITY                 TREATMENT:   (In addition to Assessment/Re-Assessment sessions the following treatments were rendered)   Pre-treatment Symptoms/Complaints:  Sharp pain. Verbalized understanding of PT role and POC  Pain: Initial:   Pain Intensity 1: 8  Pain Intervention(s) 1: Ambulation/Increased Activity, Repositioned  Post Session:  8/10, RN notified      Therapeutic Activity: (    25 minutes): Therapeutic activities including Bed transfers, Chair transfers and Ambulation on level ground to improve mobility, strength and coordination. Required minimal   to promote static and dynamic balance in standing. Treatment/Session Assessment:    · Response to Treatment:  No pain. · Interdisciplinary Collaboration:  · Physical Therapist  · Registered Nurse  · After treatment position/precautions:  · Up in chair  · Bed/Chair-wheels locked  · Bed in low position  · Call light within reach  · RN notified  · Compliance with Program/Exercises: Will assess as treatment progresses. · Recommendations/Intent for next treatment session: \"Next visit will focus on advancements to more challenging activities and reduction in assistance provided\".   Total Treatment Duration:  PT Patient Time In/Time Out  Time In: 1540  Time Out: 1612     Nicole H LEI AggarwalT

## 2017-01-25 NOTE — PROGRESS NOTES
Pt sitting in bedside chair. IVF infusing at this time. No further complaints of pain. Door open and call bell in reach.

## 2017-01-25 NOTE — PROGRESS NOTES
OT Note: Orders in for x-ray lower back secondary to complaints of lower back pain per RN. Plan to wait till x-ray resulted and will see if negative. Check back at a later time.

## 2017-01-26 ENCOUNTER — HOME HEALTH ADMISSION (OUTPATIENT)
Dept: HOME HEALTH SERVICES | Facility: HOME HEALTH | Age: 82
End: 2017-01-26

## 2017-01-26 ENCOUNTER — APPOINTMENT (OUTPATIENT)
Dept: MRI IMAGING | Age: 82
End: 2017-01-26
Attending: INTERNAL MEDICINE
Payer: MEDICARE

## 2017-01-26 VITALS
DIASTOLIC BLOOD PRESSURE: 85 MMHG | TEMPERATURE: 97.8 F | OXYGEN SATURATION: 97 % | BODY MASS INDEX: 34.49 KG/M2 | HEART RATE: 78 BPM | WEIGHT: 207 LBS | HEIGHT: 65 IN | SYSTOLIC BLOOD PRESSURE: 150 MMHG | RESPIRATION RATE: 20 BRPM

## 2017-01-26 LAB
ANION GAP BLD CALC-SCNC: 16 MMOL/L (ref 7–16)
BUN SERPL-MCNC: 22 MG/DL (ref 8–23)
CALCIUM SERPL-MCNC: 8.7 MG/DL (ref 8.3–10.4)
CHLORIDE SERPL-SCNC: 111 MMOL/L (ref 98–107)
CO2 SERPL-SCNC: 17 MMOL/L (ref 21–32)
CREAT SERPL-MCNC: 1.72 MG/DL (ref 0.8–1.5)
ERYTHROCYTE [DISTWIDTH] IN BLOOD BY AUTOMATED COUNT: 14.9 % (ref 11.9–14.6)
GLUCOSE BLD STRIP.AUTO-MCNC: 133 MG/DL (ref 65–100)
GLUCOSE BLD STRIP.AUTO-MCNC: 95 MG/DL (ref 65–100)
GLUCOSE SERPL-MCNC: 99 MG/DL (ref 65–100)
HCT VFR BLD AUTO: 36.4 % (ref 41.1–50.3)
HGB BLD-MCNC: 11.6 G/DL (ref 13.6–17.2)
MCH RBC QN AUTO: 30.1 PG (ref 26.1–32.9)
MCHC RBC AUTO-ENTMCNC: 31.9 G/DL (ref 31.4–35)
MCV RBC AUTO: 94.5 FL (ref 79.6–97.8)
MM INDURATION POC: NORMAL MM (ref 0–5)
PLATELET # BLD AUTO: 282 K/UL (ref 150–450)
PMV BLD AUTO: 10.9 FL (ref 10.8–14.1)
POTASSIUM SERPL-SCNC: 4.4 MMOL/L (ref 3.5–5.1)
PPD POC: NORMAL NEGATIVE
RBC # BLD AUTO: 3.85 M/UL (ref 4.23–5.67)
SODIUM SERPL-SCNC: 144 MMOL/L (ref 136–145)
WBC # BLD AUTO: 5.8 K/UL (ref 4.3–11.1)

## 2017-01-26 PROCEDURE — 80048 BASIC METABOLIC PNL TOTAL CA: CPT | Performed by: INTERNAL MEDICINE

## 2017-01-26 PROCEDURE — 82962 GLUCOSE BLOOD TEST: CPT

## 2017-01-26 PROCEDURE — 97530 THERAPEUTIC ACTIVITIES: CPT

## 2017-01-26 PROCEDURE — G0378 HOSPITAL OBSERVATION PER HR: HCPCS

## 2017-01-26 PROCEDURE — 99218 HC RM OBSERVATION: CPT

## 2017-01-26 PROCEDURE — 74011250637 HC RX REV CODE- 250/637: Performed by: INTERNAL MEDICINE

## 2017-01-26 PROCEDURE — 36415 COLL VENOUS BLD VENIPUNCTURE: CPT | Performed by: INTERNAL MEDICINE

## 2017-01-26 PROCEDURE — 96361 HYDRATE IV INFUSION ADD-ON: CPT

## 2017-01-26 PROCEDURE — 72148 MRI LUMBAR SPINE W/O DYE: CPT

## 2017-01-26 PROCEDURE — 96372 THER/PROPH/DIAG INJ SC/IM: CPT

## 2017-01-26 PROCEDURE — 85027 COMPLETE CBC AUTOMATED: CPT | Performed by: INTERNAL MEDICINE

## 2017-01-26 PROCEDURE — 74011250636 HC RX REV CODE- 250/636: Performed by: INTERNAL MEDICINE

## 2017-01-26 RX ORDER — ASPIRIN 81 MG/1
81 TABLET ORAL DAILY
Qty: 30 TAB | Refills: 0 | Status: SHIPPED | OUTPATIENT
Start: 2017-01-26 | End: 2017-02-27 | Stop reason: SDUPTHER

## 2017-01-26 RX ORDER — ASPIRIN 81 MG/1
81 TABLET ORAL DAILY
Qty: 30 TAB | Refills: 0 | Status: SHIPPED | OUTPATIENT
Start: 2017-01-26 | End: 2017-01-26

## 2017-01-26 RX ADMIN — ASPIRIN 81 MG 81 MG: 81 TABLET ORAL at 08:37

## 2017-01-26 RX ADMIN — MIDODRINE HYDROCHLORIDE 5 MG: 5 TABLET ORAL at 08:37

## 2017-01-26 RX ADMIN — ESCITALOPRAM OXALATE 20 MG: 10 TABLET ORAL at 08:36

## 2017-01-26 RX ADMIN — DOCUSATE SODIUM 100 MG: 100 CAPSULE, LIQUID FILLED ORAL at 08:36

## 2017-01-26 RX ADMIN — FINASTERIDE 5 MG: 5 TABLET, FILM COATED ORAL at 08:37

## 2017-01-26 RX ADMIN — Medication 10 ML: at 05:30

## 2017-01-26 RX ADMIN — Medication 5 ML: at 06:36

## 2017-01-26 RX ADMIN — GABAPENTIN 300 MG: 300 CAPSULE ORAL at 08:37

## 2017-01-26 RX ADMIN — MUPIROCIN: 20 OINTMENT TOPICAL at 08:37

## 2017-01-26 RX ADMIN — TAMSULOSIN HYDROCHLORIDE 0.4 MG: 0.4 CAPSULE ORAL at 08:37

## 2017-01-26 RX ADMIN — FERROUS SULFATE TAB 325 MG (65 MG ELEMENTAL FE) 325 MG: 325 (65 FE) TAB at 08:37

## 2017-01-26 RX ADMIN — HEPARIN SODIUM 5000 UNITS: 5000 INJECTION, SOLUTION INTRAVENOUS; SUBCUTANEOUS at 05:30

## 2017-01-26 RX ADMIN — HYDROCODONE BITARTRATE AND ACETAMINOPHEN 1 TABLET: 5; 325 TABLET ORAL at 05:24

## 2017-01-26 RX ADMIN — SOLIFENACIN SUCCINATE 10 MG: 10 TABLET, FILM COATED ORAL at 08:36

## 2017-01-26 RX ADMIN — BISACODYL 5 MG: 5 TABLET, COATED ORAL at 12:14

## 2017-01-26 RX ADMIN — CYANOCOBALAMIN TAB 1000 MCG 1000 MCG: 1000 TAB at 08:37

## 2017-01-26 RX ADMIN — MIDODRINE HYDROCHLORIDE 5 MG: 5 TABLET ORAL at 12:19

## 2017-01-26 NOTE — PROGRESS NOTES
Shift assessment completed. Patient alert and oriented x 4, but drowsy. Pt on RA. Respirations even and unlabored. No acute distress noted. Bed low, locked, call bell within reach. Side rails up x 2.

## 2017-01-26 NOTE — PROGRESS NOTES
Pt voiced complaints of right and left side pain twice during the night which was helped by norco. No other complaints voiced. Pt became confused early this morning and was found sitting in recliner about 0420. Pt helped back into bed and 3 side rails raised.  Respirations even and unlabored

## 2017-01-26 NOTE — DISCHARGE INSTRUCTIONS
Rhabdomyolysis: Care Instructions  Your Care Instructions  When you have rhabdomyolysis (say \"cdl-gfo-uh-AH-alejandra-suss\"), dying muscle cells cause toxins to build up in the blood. If not treated, it can cause life-threatening damage to the body's organs. It can be caused by many things, such as severe muscle injury, some medicines (like statins), the flu, and certain blood infections. Symptoms may include weak muscles, pain, stiffness, fever, and nausea. Your urine may also be dark. You will get treatment in the hospital. If possible, the doctor will stop the cause of muscle cell death. The doctor will take steps to protect your organs. You may have to stop taking certain medicines if they are the cause of the problem. You will also get treatment to help the kidneys remove the toxins from your blood. This includes plenty of fluids. You may get fluids through a vein (by IV). You may also need dialysis. Follow-up care is a key part of your treatment and safety. Be sure to make and go to all appointments, and call your doctor if you are having problems. It's also a good idea to know your test results and keep a list of the medicines you take. How can you care for yourself at home? · Take pain medicines exactly as directed. ¨ If the doctor gave you a prescription medicine for pain, take it as prescribed. ¨ If you are not taking a prescription pain medicine, ask your doctor if you can take an over-the-counter medicine. · Talk to your doctor about whether you need to stop taking any medicines. Follow your doctor's instructions about stopping medicines. · Drink plenty of fluids, enough so that your urine is light yellow or clear like water. If you have kidney, heart, or liver disease and have to limit fluids, talk with your doctor before you increase the amount of fluids you drink. When should you call for help?   Call your doctor now or seek immediate medical care if:  · You have new or worse muscle pain.  · You have less urine than normal or no urine. · You have new swelling in your arms or feet. · You have blood in your urine. Watch closely for changes in your health, and be sure to contact your doctor if you do not get better as expected. Where can you learn more? Go to http://bobby-jessie.info/. Enter F129 in the search box to learn more about \"Rhabdomyolysis: Care Instructions. \"  Current as of: November 20, 2015  Content Version: 11.1  © 2593-4909 [employer]. Care instructions adapted under license by [employer] (which disclaims liability or warranty for this information). If you have questions about a medical condition or this instruction, always ask your healthcare professional. [address] any warranty or liability for your use of this information. Stroke: After Your Visit     Your Care Instructions     You have had a stroke. Risk factors for stroke include being overweight, smoking, and sedentary lifestyle. This means that the blood flow to a part of your brain was blocked for some time, which damages the nerve cells in that part of the brain. The part of your body controlled by that part of your brain may not function properly now. The brain is an amazing organ that can heal itself to some degree. The stroke you had damaged part of your brain, but other parts of your brain may take over in some way for the damaged areas. You have already started this process. Going home may be hard for you and your family. The more you can try to do for yourself, the better. Remember to take each day one at a time. Follow-up care is a key part of your treatment and safety. Be sure to make and go to all appointments, and call your doctor if you are having problems. Its also a good idea to know your test results and keep a list of the medicines you take. How can you care for yourself at home?    Enter a stroke rehabilitation (rehab) program, if your doctor recommends it. Physical, speech, and occupational therapies can help you manage bathing, dressing, eating, and other basics of daily living. Eat a heart-healthy diet that is low in cholesterol, saturated fat, and salt. Eat lots of fresh fruits and vegetables and foods high in fiber. Increase your activities slowly. Take short rest breaks when you get tired. Gradually increase the amount you walk. Start out by walking a little more than you did the day before. Do not drive until your doctor says it is okay. It is normal to feel sad or depressed after a stroke. If the blues last, talk to your doctor. If you are having problems with urine leakage, go to the bathroom at regular times, including when you first wake up and at bedtime. Also, limit fluids after dinner. If you are constipated, drink plenty of fluids, enough so that your urine is light yellow or clear like water. If you have kidney, heart, or liver disease and have to limit fluids, talk with your doctor before you increase the amount of fluids you drink. Set up a regular time for using the toilet. If you continue to have constipation, your doctor may suggest using a bulking agent, such as Metamucil, or a stool softener, laxative, or enema. Medicines  Take your medicines exactly as prescribed. Call your doctor if you think you are having a problem with your medicine. You may be taking several medicines. ACE (angiotensin-converting enzyme) inhibitors, angiotensin II receptor blockers (ARBs), beta-blockers, diuretics (water pills), and calcium channel blockers control your blood pressure. Statins help lower cholesterol. Your doctor may also prescribe medicines for depression, pain, sleep problems, anxiety, or agitation.   If your doctor has given you medicine that prevents blood clots, such as warfarin (Coumadin), aspirin combined with extended-release dipyridamole (Aggrenox), clopidogrel (Plavix), or aspirin to prevent another stroke, you should:  Tell your dentist, pharmacist, and other health professionals that you take these medicines. Watch for unusual bruising or bleeding, such as blood in your urine, red or black stools, or bleeding from your nose or gums. Get regular blood tests to check your clotting time if you are taking Coumadin. Wear medical alert jewelry that says you take blood thinners. You can buy this at most drugstores. Do not take any over-the-counter medicines or herbal products without talking to your doctor first.  If you take birth control pills or hormone replacement therapy, talk to your doctor about whether they are right for you. For family members and caregivers  Make the home safe. Set up a room so that your loved one does not have to climb stairs. Be sure the bathroom is on the same floor. Move throw rugs and furniture that could cause falls, and make sure that the lighting is good. Put grab bars and seats in tubs and showers. Find out what your loved one can do and what he or she needs help with. Try not to do things for your loved one that your loved one can do on his or her own. Help him or her learn and practice new skills. Visit and talk with your loved one often. Try doing activities together that you both enjoy, such as playing cards or board games. Keep in touch with your loved one's friends as much as you can, and encourage them to visit. Take care of yourself. Do not try to do everything yourself. Ask other family members to help. Eat well, get enough rest, and take time to do things that you enjoy. Keep up with your own doctor visits, and make sure to take your medicines regularly. Get out of the house as much as you can. Join a local support group. Find out if you qualify for home health care visits to help with rehab or for adult day care. When should you call for help? Call 911 anytime you think you may need emergency care.  For example, call if:  You have signs of another stroke. These may include:  Sudden numbness, paralysis, or weakness in your face, arm, or leg, especially on only one side of your body. New problems with walking or balance. Sudden vision changes. Drooling or slurred speech. New problems speaking or understanding simple statements, or you feel confused. A sudden, severe headache that is different from past headaches. Call 911 even if these symptoms go away in a few minutes. You cough up blood. You vomit blood or what looks like coffee grounds. You pass maroon or very bloody stools. Call your doctor now or seek immediate medical care if:  You have new bruises or blood spots under your skin. You have a nosebleed. Your gums bleed when you brush your teeth. You have blood in your urine. Your stools are black and tarlike or have streaks of blood. You have vaginal bleeding when you are not having your period, or heavy period bleeding. You have new symptoms that may be related to your stroke, such as falls or trouble swallowing. Watch closely for changes in your health, and be sure to contact your doctor if you have any problems. Where can you learn more? Go to Mirror42.be    Enter C294  in the search box to learn more about \"Stroke: After Your Visit\". © 5643-6432 [employer]. Care instructions adapted under license by [employer] (which disclaims liability or warranty for this information). This care instruction is for use with your licensed healthcare professional. If you have questions about a medical condition or this instruction, always ask your healthcare professional. Olman Saini any warranty or liability for your use of this information.

## 2017-01-26 NOTE — DISCHARGE SUMMARY
Hospitalist Discharge Summary     Patient ID:  Karena Wei  [id number]  37 y.o.  3/3/1932  Admit date: 1/23/2017  2:31 PM  Discharge date and time: 1/26/2017  Attending: Renetta Velasquez DO  PCP:  Lamine Dean PA-C  Treatment Team: Attending Provider: Renetta Velasquez DO; Utilization Review: Dalila Wall; Care Manager: Shaun Feng RN    Principal Diagnosis <principal problem not specified>   Active Problems:    Controlled type 2 diabetes mellitus with stage 4 chronic kidney disease, with long-term current use of insulin ([facility] [address] (6/3/2016)      BPH (benign prostatic hyperplasia) (10/27/2015)      UTI (urinary tract infection) (5/30/2016)      Acute on chronic renal failure ([facility] [address] (6/3/2016)      Mixed hyperlipidemia (9/9/2016)      Rhabdomyolysis (1/23/2017)      TIA (transient ischemic attack) (1/23/2017)             Hospital Course:  Please refer to the admission H&P for details of presentation. In summary,  pleasant 84M with pmhx of IDDM, CKD, [place], HLP, BPH presents via EMS after falling at home night before. Pt reports that he slipped off edge of bed hitting his head on nightstand and then attempted several times to get up but too weak to get off floor and stayed there until found by grandson this AM. Pt lives in [facility] connected to his daughter's home (who was in [place] this week) and also has caretaker 2-3 days/week Recently treated for UTI with cipro (course of cipro completed) Caretaker reports extremely slurred speech that has resolved during ED eval.     Admitted for CVA work-up. MRI brain non acute, Echo/carotid duplex essentially unremarkable. Was noted to have rhabdo and ROSA which have improved with IVF. Pt did report severe back pain prompting MRI showing multilevel disc bulging and other chronic changes. On day of discharge patient is ambulating from bed to chair to restroom.  [facility]        Significant Diagnostic Studies:       Labs: Results:       Chemistry Recent Labs      01/26/17   0621 01/25/17   0718  01/24/17   0636   GLU  99  107*  108*   NA  144  143  145   K  4.4  4.2  3.9   CL  111*  110*  111*   CO2  17*  24  22   BUN  22  27*  32*   CREA  1.72*  2.07*  2.27*   CA  8.7  8.4  8.4   AGAP  16  9  12      CBC w/Diff Recent Labs      01/26/17   0621  01/24/17   0636   WBC  5.8  5.8   RBC  3.85*  3.65*   HGB  11.6*  10.6*   HCT  36.4*  33.9*   PLT  282  315      Cardiac Enzymes Recent Labs      01/25/17 0718  01/24/17   0636   CPK  495*  865*      Coagulation No results for input(s): PTP, INR, APTT in the last 72 hours. No lab exists for component: INREXT    Lipid Panel Lab Results   Component Value Date/Time    Cholesterol, total 95 01/24/2017 06:36 AM    HDL Cholesterol 25 01/24/2017 06:36 AM    LDL, calculated 43.6 01/24/2017 06:36 AM    VLDL, calculated 26.4 01/24/2017 06:36 AM    Triglyceride 132 01/24/2017 06:36 AM    CHOL/HDL Ratio 3.8 01/24/2017 06:36 AM      BNP No results for input(s): BNPP in the last 72 hours. Liver Enzymes No results for input(s): TP, ALB, TBIL, AP, SGOT, GPT in the last 72 hours. No lab exists for component: DBIL   Thyroid Studies Lab Results   Component Value Date/Time    TSH 1.330 01/23/2017 12:55 PM            Discharge Exam:  Visit Vitals    /85    Pulse 78    Temp 97.8 °F (36.6 °C)    Resp 20    Ht 5' 5\" (1.651 m)    Wt 93.9 kg (207 lb)    SpO2 97%    BMI 34.45 kg/m2     General appearance: alert, cooperative, no distress, appears stated age  Lungs: clear to auscultation bilaterally  Heart: regular rate and rhythm, S1, S2 normal, no murmur, click, rub or gallop  Abdomen: soft, non-tender.  Bowel sounds normal. No masses,  no organomegaly  Extremities: no cyanosis or edema  Neurologic: Grossly normal     Disposition: home with [facility]  Discharge Condition: stable  Patient Instructions:   Current Discharge Medication List      START taking these medications    Details   aspirin delayed-release (ECOTRIN LOW STRENGTH) 81 mg tablet Take 1 Tab by mouth daily. Qty: 30 Tab, Refills: 0         CONTINUE these medications which have NOT CHANGED    Details   [place]ul 247-50-30 mg-mg-million tab Take  by mouth. traMADol (ULTRAM) 50 mg tablet Take 50 mg by mouth every six (6) hours as needed for Pain. bethanechol (URECHOLINE) 25 mg tablet Take 1 Tab by mouth Before breakfast, lunch, and dinner. Qty: 270 Tab, Refills: 1      atorvastatin (LIPITOR) 20 mg tablet Take 1 Tab by mouth daily. Qty: 90 Tab, Refills: 1    Associated Diagnoses: Mixed hyperlipidemia      fenofibrate nanocrystallized (TRICOR) 145 mg tablet Take 1 Tab by mouth daily. Qty: 90 Tab, Refills: 1      zolpidem CR (AMBIEN CR) 12.5 mg tablet Take 1 Tab by mouth nightly as needed for Sleep. Max Daily Amount: 12.5 mg.  Qty: 30 Tab, Refills: 3      traZODone (DESYREL) 100 mg tablet Take 1 Tab by mouth nightly. Qty: 90 Tab, Refills: 1      tolterodine ER (DETROL LA) 4 mg ER capsule Take 1 Cap by mouth daily. Qty: 90 Cap, Refills: 1      gabapentin (NEURONTIN) 300 mg capsule Take 1 Cap by mouth daily. Qty: 90 Cap, Refills: 1      tamsulosin (FLOMAX) 0.4 mg capsule Take 1 Cap by mouth daily. Qty: 90 Cap, Refills: 1    Associated Diagnoses: Benign non-nodular prostatic hyperplasia, presence of lower urinary tract symptoms unspecified      finasteride (PROSCAR) 5 mg tablet Take 1 Tab by mouth daily. Qty: 90 Tab, Refills: 1    Associated Diagnoses: Benign non-nodular prostatic hyperplasia, presence of lower urinary tract symptoms unspecified      midodrine (PROAMITINE) 5 mg tablet Take 1 Tab by mouth three (3) times daily. escitalopram oxalate (LEXAPRO) 20 mg tablet Take 1 Tab by mouth daily. Qty: 90 Tab, Refills: 1    Associated Diagnoses: Moderate episode of recurrent major depressive disorder (HCC)      pen needle, diabetic (NOVOFINE PLUS) 32 gauge x 1/6\" ndle 1 Each by Does Not Apply route daily.  Use daily with victoza  Qty: 100 Pen Needle, Refills: 1      insulin glargine (LANTUS SOLOSTAR) 100 unit/mL (3 mL) pen 10 Units by SubCUTAneous route daily. Qty: 1 Each, Refills: 5      HYDROcodone-acetaminophen (NORCO) 5-325 mg per tablet Take 1 Tab by mouth every eight (8) hours as needed. Max Daily Amount: 3 Tabs. Qty: 20 Tab, Refills: 0      cyanocobalamin 1,000 mcg tablet Take 1,000 mcg by mouth daily. ferrous sulfate 325 mg (65 mg iron) tablet Take  by mouth Daily (before breakfast). Vit A,C,E-Zinc-Copper (PRESERVISION) cap capsule Take 1 Cap by mouth. docusate sodium (COLACE) 100 mg capsule Take 100 mg by mouth two (2) times a day.          STOP taking these medications       ciprofloxacin HCl (CIPRO) 500 mg tablet Comments:   Reason for Stopping:         varicella zoster vacine live (ZOSTAVAX) 19,400 unit/0.65 mL susr injection Comments:   Reason for Stopping:               Activity: as tolerated  Diet: cardiac      Follow-up  ·  PCP in 1-2 weeks   Time spent to discharge patient 35 minutes  Signed:  Joslyn Young DO  1/26/2017  2:39 PM

## 2017-01-26 NOTE — PROGRESS NOTES
Pt sitting in bedside chair. Family at bedside. Respirations even and unlabored at this time.  No complaints or needs at this time

## 2017-01-26 NOTE — PROGRESS NOTES
Discharge summary reviewed with patient's daughter. Written prescription for aspirin provided. IV removed. Opportunity for questions provided.

## 2017-01-26 NOTE — PROGRESS NOTES
OT Note:  Patient off floor this am for MRI. Will attempt again as time permits.   Debbie Lester, OT

## 2017-01-26 NOTE — PROGRESS NOTES
Problem: Self Care Deficits Care Plan (Adult)  Goal: *Acute Goals and Plan of Care (Insert Text)  1. Patient will perform grooming and upper body dressing with supervision within 7 days with equipment as needed. 2. Patient will perform lower body dressing with minimal assistance within 7 days with equipment as needed. 3. Patient will perform bathing and toileting with supervision within 7 days with equipment as needed. 4. Patient will perform toilet transfer with supervision within 7 days with equipment as needed. 5. Pt will participate in B UE therapeutic exercises for 8 minutes with 3 rest breaks within 7 days. OCCUPATIONAL THERAPY: Treatment Day: 1st and PM 1/26/2017  OBSERVATION: Hospital Day: 4  [place] / [place] / Product Type: Medicare /      NAME/AGE/GENDER: Unruly Thurston is a 80 y.o. male   PRIMARY DIAGNOSIS:  Acute on chronic renal failure (HCC) <principal problem not specified> <principal problem not specified>        ICD-10: Treatment Diagnosis:        · Stiffness of Right Shoulder, Not elsewhere classified (M25.611)  · Generalized Muscle Weakness (M62.81)  · History of falling (Z91.81)   Precautions/Allergies:        Fall, Review of patient's allergies indicates no known allergies. ASSESSMENT:      Mr. Júnior Jiménez admitted with Chronic Renal Failure and s/p TIA. Patient lives in basement of daughter's house. Patient has a lift chair to take him upstairs. Patient also owns a rollator, RW, TTB, and has grab bars in bathroom. He has a caregiver that assists him 3 x's/week. Patient able to ambulate using rollator prior to admit. Patient just returned from Xray. Patient stood from high surface with minimal assistance using RW. Patient ambulated from stretcher outside room to his chair in his room next to window. Patient sat with minimal assistance. Patient steadily progressing towards goals. Recommend Acute Rehab Services. Cont OT per tx plan. This section established at most recent assessment   PROBLEM LIST (Impairments causing functional limitations):  1. Decreased Strength  2. Decreased ADL/Functional Activities  3. Decreased Transfer Abilities  4. Decreased Ambulation Ability/Technique  5. Decreased Balance  6. Decreased Activity Tolerance  7. Increased Fatigue  8. Decreased Flexibility/Joint Mobility  9. Edema/Girth  10. Decreased Stone with Home Exercise Program    INTERVENTIONS PLANNED: (Benefits and precautions of occupational therapy have been discussed with the patient.)  1. Activities of daily living training  2. Adaptive equipment training  3. Balance training  4. Clothing management  5. Cognitive training  6. Donning&doffing training  7. Group therapy  8. Hygiene training  9. Neuromuscular re-eduation  10. Re-evaluation  11. Sensory reintergration training  12. Theraputic activity  13. Theraputic exercise         TREATMENT PLAN: Frequency/Duration: Follow patient 3 x/s week to address above goals. Rehabilitation Potential For Stated Goals: GOOD      RECOMMENDED REHABILITATION/EQUIPMENT: (at time of discharge pending progress): Continue Skilled Therapy and Rehab. OCCUPATIONAL PROFILE AND HISTORY:   History of Present Injury/Illness (Reason for Referral):  Patient pleasant 84M with pmhx of IDDM, CKD, [place], HLP, BPH presents via EMS after falling at home night before. Pt reports that he slipped off edge of bed hitting his head on nightstand and then attempted several times to get up but too weak to get off floor and stayed there until found by grandson this AM. Pt lives in [facility] connected to his daughter's home (who was in [place] this week) and also has caretaker 2-3 days/week who assists in history today. Recently treated for UTI with cipro (on day 7/10). Caretaker reports extremely slurred speech that has resolved during ED eval.   ED w/u - CT head non acute, CPK 1200, Cr 2.7 (baseline 1.7 to 2.2).  Hospitalist asked to admit for TIA vs CVA  Past Medical History/Comorbidities:   Mr. Debbie Thomas  has a past medical history of Arthritis; Bilateral impacted cerumen (8/4/2016); Chronic kidney disease; DDD (degenerative disc disease), cervical; Diabetes ([facility] [address] Hearing abnormally acute (10/26/2015); Hearing deficit; History of cataract surgery (10/26/2015); Hypercholesterolemia; Kidney disease; Macular degeneration; Sleep disorder; SNHL (sensorineural hearing loss) (8/4/2016); and Vision loss (8/4/2016). Mr. Debbie Thomas  has a past surgical history that includes cataract removal; prostatectomy; urological; tonsillectomy; other surgical; colonoscopy (2015); knee replacement (Left, 2016); and shoulder replacement. Social History/Living Environment:   Home Environment: Private residence  # Steps to Enter: 0  One/Two Story Residence: One story  Living Alone: No  Support Systems: Family member(s)  Patient Expects to be Discharged to[de-identified] Private residence  Current DME Used/Available at Home: Grab bars, Walker, rollator, Walker, rolling, Tub transfer bench, Lift chair  Tub or Shower Type: Tub  Prior Level of Function/Work/Activity:  Patient able to perform ADLs with supervision to modified independence prior to admit/      Number of Personal Factors/Comorbidities that affect the Plan of Care  · Arthritis  · DDD (cervical)  · TIA: Expanded review of therapy/medical records (1-2):  MODERATE COMPLEXITY   ASSESSMENT OF OCCUPATIONAL PERFORMANCE[de-identified]   Activities of Daily Living:           Basic ADLs (From Assessment) Complex ADLs (From Assessment)   Basic ADL  Feeding: Setup  Oral Facial Hygiene/Grooming: Setup  Bathing: Minimum assistance  Upper Body Dressing: Contact guard assistance  Lower Body Dressing: Moderate assistance  Toileting: Maximum assistance Instrumental ADL  Meal Preparation: Total assistance  Homemaking:  Total assistance  Medication Management: Minimum assistance   Grooming/Bathing/Dressing Activities of Daily Living Bed/Mat Mobility  Sit to Stand: Minimum assistance  Bed to Chair: Minimum assistance          Most Recent Physical Functioning:   Gross Assessment:                  Posture:  Posture (WDL): Exceptions to WDL  Posture Assessment: Forward head  Balance:    Bed Mobility:     Wheelchair Mobility:     Transfers:  Sit to Stand: Minimum assistance  Stand to Sit: Minimum assistance  Bed to Chair: Minimum assistance                    Patient Vitals for the past 6 hrs:   BP SpO2 Pulse   17 1219 150/85 - -   17 1236 150/85 97 % 78        Mental Status  Neurologic State: Alert  Orientation Level: Oriented X4  Cognition: Follows commands  Perception: Appears intact  Perseveration: No perseveration noted  Safety/Judgement: Awareness of environment, Fall prevention                               Physical Skills Involved:  1. Range of Motion  2. Balance  3. Mobility  4. Strength  5. Endurance Cognitive Skills Affected (resulting in the inability to perform in a timely and safe manner):  1. Problem Solving  2. Remembering Psychosocial Skills Affected:  1. Habits  2. Routines and Behaviors   Number of elements that affect the Plan of Care: 3-5:  MODERATE COMPLEXITY   CLINICAL DECISION MAKIN[address] [place] [id number] AM-PAC 6 Clicks   Basic Mobility Inpatient Short Form  How much help from another person does the patient currently need. .. Total A Lot A Little None   1. Putting on and taking off regular lower body clothing?   [ ] 1   [X] 2   [ ] 3   [ ] 4   2. Bathing (including washing, rinsing, drying)? [ ] 1   [ ] 2   [X] 3   [ ] 4   3. Toileting, which includes using toilet, bedpan or urinal?   [ ] 1   [X] 2   [ ] 3   [ ] 4   4. Putting on and taking off regular upper body clothing?   [ ] 1   [ ] 2   [X] 3   [ ] 4   5. Taking care of personal grooming such as brushing teeth? [ ] 1   [ ] 2   [X] 3   [ ] 4   6. Eating meals?    [ ] 1   [ ] 2   [X] 3   [ ] 4   © , Trustees of [address] [place] [address] [address] [address], under license to [employer]. All rights reserved    Score:  Initial: CK Most Recent: X (Date: -- )     Interpretation of Tool:  Represents activities that are increasingly more difficult (i.e. Bed mobility, Transfers, Gait). Score 24 23 22-20 19-15 14-10 9-7 6       Modifier CH CI CJ CK CL CM CN         · Self Care:               - CURRENT STATUS:    CK - 40%-59% impaired, limited or restricted               - GOAL STATUS:           CJ - 20%-39% impaired, limited or restricted               - D/C STATUS:                       ---------------To be determined---------------  [place] / [place] / Product Type: Medicare /       Medical Necessity:     · Patient demonstrates good rehab potential due to higher previous functional level. Reason for Services/Other Comments:  · Patient continues to require skilled intervention due to patient's inability to take care of self. Use of outcome tool(s) and clinical judgement create a POC that gives a: MODERATE COMPLEXITY             TREATMENT:   (In addition to Assessment/Re-Assessment sessions the following treatments were rendered)      Pre-treatment Symptoms/Complaints:    Pain: Initial:   Pain Intensity 1: 0 0 Post Session:  0      Therapeutic Activity: (    12 minutes): Therapeutic activities including Bed transfers, Chair transfers and Ambulation on level ground to improve mobility, strength and balance. Required minimal   to promote static and dynamic balance in standing. Treatment/Session Assessment:         Response to Treatment:  Patient tolerated tx well.      Interdisciplinary Collaboration:   · Physical Therapist  · Registered Nurse  · Certified Nursing Assistant/Patient Care Technician     After treatment position/precautions:   · Up in chair  · Bed/Chair-wheels locked  · Bed in low position  · Call light within reach  · RN notified  · Family at bedside     Compliance with Program/Exercises: compliant all of the time.     Recommendations/Intent for next treatment session:   \"Next visit will focus on advancements to more challenging activities and reduction in assistance provided\"       Total Treatment Duration:  OT Patient Time In/Time Out  Time In: 1157  Time Out: [address], OT

## 2017-01-26 NOTE — PROGRESS NOTES
Pt sleeping in bed at this time. Respirations even and unlabored on RA. No visual signs of discomfort. IVF infusing @ 100ml/hr. Call bell in reach.

## 2017-01-26 NOTE — PROGRESS NOTES
Hospitalist Progress Note    2017  Admit Date: 2017  2:31 PM   NAME: Julieth Mascorro   :  3/3/1932   MRN:  [id number]   Attending: Bran Alegria DO  PCP:  Luz Thomas PA-C    SUBJECTIVE:   Patient pleasant 28C with pmhx of IDDM, CKD, [place], HLP, BPH presents via EMS after falling at home night before. Pt reports that he slipped off edge of bed hitting his head on nightstand and then attempted several times to get up but too weak to get off floor and stayed there until found by grandson this AM. Pt lives in [facility] connected to his daughter's home (who was in [place] this week) and also has caretaker 2-3 days/week Recently treated for UTI with cipro (on day 8/10). Caretaker reports extremely slurred speech that has resolved during ED eval.   Admitted for CVA work-up. MRI brain non acute, Echo/carotid duplex wnl.     - reports mid back and right flank pain present with rest and worse with movement. Review of Systems negative with exception of pertinent positives noted above  PHYSICAL EXAM     Visit Vitals    /87    Pulse 66    Temp 97.5 °F (36.4 °C)    Resp 20    Ht 5' 5\" (1.651 m)    Wt 93.9 kg (207 lb)    SpO2 99%    BMI 34.45 kg/m2      Temp (24hrs), Av.9 °F (36.6 °C), Min:97.5 °F (36.4 °C), Max:98.4 °F (36.9 °C)    Oxygen Therapy  O2 Sat (%): 99 % (17)  Pulse via Oximetry: 74 beats per minute (17 1800)  O2 Device: Room air (17 0200)    Intake/Output Summary (Last 24 hours) at 17  Last data filed at 17 1846   Gross per 24 hour   Intake              360 ml   Output                0 ml   Net              360 ml      General: No acute distress    Lungs:  CTA Bilaterally. Heart:  Regular rate and rhythm,  No murmur, rub, or gallop  Abdomen: Soft, Non distended, Non tender, Positive bowel sounds  Extremities: No cyanosis, clubbing or edema  Neurologic:  No focal deficits  Back;   No TTP of spinal process, no ecchymosis     ASSESSMENT Active Hospital Problems    Diagnosis Date Noted    Rhabdomyolysis 01/23/2017    TIA (transient ischemic attack) 01/23/2017    Mixed hyperlipidemia 09/09/2016    Acute on chronic renal failure ([facility] [place] [address] 06/03/2016    Controlled type 2 diabetes mellitus with stage 4 chronic kidney disease, with long-term current use of insulin ([facility] [address] 06/03/2016    UTI (urinary tract infection) 05/30/2016    BPH (benign prostatic hyperplasia) 10/27/2015     A/P  - TIA - cont asa. MRI non acute, echo/duplex carotid overall unremarkable  - Rhabdo - cpk down-trending. Cont ivf tonight.  - HLP - holding statins in presence of rhabdo. Resume lipitor at discharge   - Acute/Chronic Renal failure -improved with ivf.   - IDDM - cont current. A1C 6.8  - BPH - resume home meds  - recent UTI - continue cipro d 8/10. No cx done at PCP office. Repeat UA on admit wnl.   - Back Pain - T spine film ? compression fx. Pain limiting ambulation. Ck MRI spine.        Dispo - hopefully home in AM With Claudy Romo pending back pain/ MRI report  DVT Prophylaxis: hep sq    Signed By: Keila William DO     January 25, 2017

## 2017-01-26 NOTE — PROGRESS NOTES
MRI of the L spine is still pending. Patient remains in Observation status. He is normally independent at baseline and lives in an apartment attached to his son/daughter-in-law's home. He will likely discharge home today pending results of the MRI. Patient is unable to ambulate primarily due to back pain. He will likely need home health follow-up at discharge. Case Management will continue to follow. Care Management Interventions  Transition of Care Consult (CM Consult): Discharge Planning  Discharge Durable Medical Equipment: No  Physical Therapy Consult: Yes  Occupational Therapy Consult: Yes  Speech Therapy Consult: Yes  Current Support Network: Relative's Home  Confirm Follow Up Transport: Family  Plan discussed with Pt/Family/Caregiver: Yes  Freedom of Choice Offered: Yes  Discharge Location  Discharge Placement: Other: (To be determined.  Home s. home with home health.)
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
## 2017-02-09 PROBLEM — F33.1 MODERATE EPISODE OF RECURRENT MAJOR DEPRESSIVE DISORDER (HCC): Status: ACTIVE | Noted: 2017-02-09

## 2017-02-09 PROBLEM — T79.6XXA TRAUMATIC RHABDOMYOLYSIS (HCC): Status: ACTIVE | Noted: 2017-02-09
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
## 2017-02-09 PROBLEM — R54 FRAIL ELDERLY: Status: ACTIVE | Noted: 2017-02-09

## 2017-02-09 PROBLEM — I95.9 HYPOTENSION: Status: ACTIVE | Noted: 2017-02-09

## 2017-02-09 PROBLEM — S00.83XA CONTUSION OF FACE: Status: ACTIVE | Noted: 2017-02-09

## 2017-03-17 PROBLEM — S51.019A SKIN TEAR OF ELBOW WITHOUT COMPLICATION: Status: ACTIVE | Noted: 2017-03-17

## 2017-03-17 PROBLEM — L03.114 CELLULITIS OF LEFT ELBOW: Status: ACTIVE | Noted: 2017-03-17

## 2017-04-20 ENCOUNTER — HOME HEALTH ADMISSION (OUTPATIENT)
Dept: HOME HEALTH SERVICES | Facility: HOME HEALTH | Age: 82
End: 2017-04-20

## 2017-05-31 PROBLEM — N18.30 TYPE 2 DIABETES MELLITUS WITH STAGE 3 CHRONIC KIDNEY DISEASE, WITH LONG-TERM CURRENT USE OF INSULIN (HCC): Status: ACTIVE | Noted: 2017-05-31

## 2017-05-31 PROBLEM — Z79.4 TYPE 2 DIABETES MELLITUS WITH STAGE 3 CHRONIC KIDNEY DISEASE, WITH LONG-TERM CURRENT USE OF INSULIN (HCC): Status: ACTIVE | Noted: 2017-05-31

## 2017-05-31 PROBLEM — E11.22 TYPE 2 DIABETES MELLITUS WITH STAGE 3 CHRONIC KIDNEY DISEASE, WITH LONG-TERM CURRENT USE OF INSULIN (HCC): Status: ACTIVE | Noted: 2017-05-31

## 2017-07-18 PROBLEM — J30.1 SEASONAL ALLERGIC RHINITIS DUE TO POLLEN: Status: ACTIVE | Noted: 2017-07-18

## 2017-07-18 PROBLEM — J20.9 BRONCHITIS, ACUTE, WITH BRONCHOSPASM: Status: ACTIVE | Noted: 2017-07-18

## 2017-07-18 PROBLEM — K59.00 CONSTIPATION: Status: ACTIVE | Noted: 2017-07-18
